# Patient Record
Sex: FEMALE | Race: OTHER | NOT HISPANIC OR LATINO | URBAN - METROPOLITAN AREA
[De-identification: names, ages, dates, MRNs, and addresses within clinical notes are randomized per-mention and may not be internally consistent; named-entity substitution may affect disease eponyms.]

---

## 2019-06-19 ENCOUNTER — OFFICE VISIT (OUTPATIENT)
Dept: URGENT CARE | Facility: CLINIC | Age: 69
End: 2019-06-19
Payer: MEDICARE

## 2019-06-19 ENCOUNTER — APPOINTMENT (OUTPATIENT)
Dept: RADIOLOGY | Facility: CLINIC | Age: 69
End: 2019-06-19
Payer: MEDICARE

## 2019-06-19 VITALS
WEIGHT: 217 LBS | OXYGEN SATURATION: 100 % | DIASTOLIC BLOOD PRESSURE: 94 MMHG | HEART RATE: 90 BPM | HEIGHT: 70 IN | BODY MASS INDEX: 31.07 KG/M2 | TEMPERATURE: 98 F | RESPIRATION RATE: 18 BRPM | SYSTOLIC BLOOD PRESSURE: 158 MMHG

## 2019-06-19 DIAGNOSIS — S52.531A CLOSED COLLES' FRACTURE OF RIGHT RADIUS, INITIAL ENCOUNTER: ICD-10-CM

## 2019-06-19 DIAGNOSIS — M25.531 RIGHT WRIST PAIN: Primary | ICD-10-CM

## 2019-06-19 DIAGNOSIS — S50.01XA CONTUSION OF RIGHT ELBOW, INITIAL ENCOUNTER: ICD-10-CM

## 2019-06-19 DIAGNOSIS — M25.531 RIGHT WRIST PAIN: ICD-10-CM

## 2019-06-19 PROCEDURE — 29125 APPL SHORT ARM SPLINT STATIC: CPT | Performed by: EMERGENCY MEDICINE

## 2019-06-19 PROCEDURE — 73110 X-RAY EXAM OF WRIST: CPT

## 2019-06-19 PROCEDURE — 99203 OFFICE O/P NEW LOW 30 MIN: CPT | Performed by: EMERGENCY MEDICINE

## 2019-06-19 RX ORDER — SIMVASTATIN 10 MG
10 TABLET ORAL
COMMUNITY
End: 2019-06-21 | Stop reason: ALTCHOICE

## 2019-06-19 RX ORDER — MONTELUKAST SODIUM 10 MG/1
10 TABLET ORAL
COMMUNITY
Start: 2019-06-15

## 2019-06-19 RX ORDER — FLUTICASONE PROPIONATE 50 MCG
1 SPRAY, SUSPENSION (ML) NASAL EVERY MORNING
COMMUNITY

## 2019-06-19 RX ORDER — OXYCODONE AND ACETAMINOPHEN 2.5; 325 MG/1; MG/1
1 TABLET ORAL EVERY 4 HOURS PRN
Qty: 12 TABLET | Refills: 0 | Status: SHIPPED | OUTPATIENT
Start: 2019-06-19 | End: 2019-06-22

## 2019-06-20 RX ORDER — LEVOFLOXACIN 500 MG/1
500 TABLET, FILM COATED ORAL DAILY
Refills: 0 | COMMUNITY
Start: 2019-04-22 | End: 2019-06-21 | Stop reason: ALTCHOICE

## 2019-06-21 ENCOUNTER — OFFICE VISIT (OUTPATIENT)
Dept: OBGYN CLINIC | Facility: CLINIC | Age: 69
End: 2019-06-21
Payer: MEDICARE

## 2019-06-21 VITALS
WEIGHT: 224 LBS | DIASTOLIC BLOOD PRESSURE: 73 MMHG | BODY MASS INDEX: 33.18 KG/M2 | SYSTOLIC BLOOD PRESSURE: 136 MMHG | HEIGHT: 69 IN | HEART RATE: 71 BPM

## 2019-06-21 DIAGNOSIS — M25.531 RIGHT WRIST PAIN: Primary | ICD-10-CM

## 2019-06-21 DIAGNOSIS — S52.571A OTHER CLOSED INTRA-ARTICULAR FRACTURE OF DISTAL END OF RIGHT RADIUS, INITIAL ENCOUNTER: ICD-10-CM

## 2019-06-21 PROBLEM — S52.531A FRACTURE, COLLES, RIGHT, CLOSED: Status: ACTIVE | Noted: 2019-06-21

## 2019-06-21 PROCEDURE — 99204 OFFICE O/P NEW MOD 45 MIN: CPT | Performed by: ORTHOPAEDIC SURGERY

## 2019-06-21 RX ORDER — CEFAZOLIN SODIUM 2 G/50ML
2000 SOLUTION INTRAVENOUS ONCE
Status: CANCELLED | OUTPATIENT
Start: 2019-06-25

## 2019-06-21 RX ORDER — ATORVASTATIN CALCIUM 10 MG/1
10 TABLET, FILM COATED ORAL 3 TIMES WEEKLY
COMMUNITY

## 2019-06-24 ENCOUNTER — TRANSCRIBE ORDERS (OUTPATIENT)
Dept: ADMINISTRATIVE | Facility: HOSPITAL | Age: 69
End: 2019-06-24

## 2019-06-24 ENCOUNTER — OFFICE VISIT (OUTPATIENT)
Dept: URGENT CARE | Facility: CLINIC | Age: 69
End: 2019-06-24
Payer: MEDICARE

## 2019-06-24 VITALS
BODY MASS INDEX: 32.07 KG/M2 | OXYGEN SATURATION: 96 % | HEIGHT: 70 IN | RESPIRATION RATE: 18 BRPM | TEMPERATURE: 98.9 F | DIASTOLIC BLOOD PRESSURE: 64 MMHG | HEART RATE: 82 BPM | SYSTOLIC BLOOD PRESSURE: 140 MMHG | WEIGHT: 224 LBS

## 2019-06-24 VITALS
DIASTOLIC BLOOD PRESSURE: 64 MMHG | WEIGHT: 224 LBS | BODY MASS INDEX: 32.07 KG/M2 | OXYGEN SATURATION: 96 % | TEMPERATURE: 98.9 F | HEIGHT: 70 IN | SYSTOLIC BLOOD PRESSURE: 140 MMHG | HEART RATE: 82 BPM | RESPIRATION RATE: 18 BRPM

## 2019-06-24 DIAGNOSIS — Z01.818 PRE-OP TESTING: ICD-10-CM

## 2019-06-24 DIAGNOSIS — S62.101D CLOSED FRACTURE OF RIGHT WRIST WITH ROUTINE HEALING, SUBSEQUENT ENCOUNTER: Primary | ICD-10-CM

## 2019-06-24 DIAGNOSIS — N30.01 ACUTE CYSTITIS WITH HEMATURIA: ICD-10-CM

## 2019-06-24 DIAGNOSIS — J06.9 VIRAL UPPER RESPIRATORY TRACT INFECTION: Primary | ICD-10-CM

## 2019-06-24 LAB
ATRIAL RATE: 75 BPM
P AXIS: 73 DEGREES
PR INTERVAL: 146 MS
QRS AXIS: 11 DEGREES
QRSD INTERVAL: 102 MS
QT INTERVAL: 394 MS
QTC INTERVAL: 439 MS
SL AMB  POCT GLUCOSE, UA: NEGATIVE
SL AMB LEUKOCYTE ESTERASE,UA: ABNORMAL
SL AMB POCT BILIRUBIN,UA: NEGATIVE
SL AMB POCT BLOOD,UA: ABNORMAL
SL AMB POCT CLARITY,UA: ABNORMAL
SL AMB POCT COLOR,UA: ABNORMAL
SL AMB POCT KETONES,UA: NEGATIVE
SL AMB POCT NITRITE,UA: POSITIVE
SL AMB POCT PH,UA: 6
SL AMB POCT SPECIFIC GRAVITY,UA: 1.01
SL AMB POCT URINE PROTEIN: ABNORMAL
SL AMB POCT UROBILINOGEN: ABNORMAL
T WAVE AXIS: 37 DEGREES
VENTRICULAR RATE: 75 BPM

## 2019-06-24 PROCEDURE — 87086 URINE CULTURE/COLONY COUNT: CPT | Performed by: EMERGENCY MEDICINE

## 2019-06-24 PROCEDURE — 87186 SC STD MICRODIL/AGAR DIL: CPT | Performed by: EMERGENCY MEDICINE

## 2019-06-24 PROCEDURE — 99213 OFFICE O/P EST LOW 20 MIN: CPT | Performed by: EMERGENCY MEDICINE

## 2019-06-24 PROCEDURE — 93010 ELECTROCARDIOGRAM REPORT: CPT | Performed by: INTERNAL MEDICINE

## 2019-06-24 PROCEDURE — 93005 ELECTROCARDIOGRAM TRACING: CPT | Performed by: EMERGENCY MEDICINE

## 2019-06-24 PROCEDURE — 87077 CULTURE AEROBIC IDENTIFY: CPT | Performed by: EMERGENCY MEDICINE

## 2019-06-24 PROCEDURE — G0463 HOSPITAL OUTPT CLINIC VISIT: HCPCS | Performed by: EMERGENCY MEDICINE

## 2019-06-24 RX ORDER — PHENAZOPYRIDINE HYDROCHLORIDE 200 MG/1
200 TABLET, FILM COATED ORAL
Qty: 10 TABLET | Refills: 0 | Status: SHIPPED | OUTPATIENT
Start: 2019-06-24 | End: 2019-06-24 | Stop reason: CLARIF

## 2019-06-24 RX ORDER — NITROFURANTOIN 25; 75 MG/1; MG/1
100 CAPSULE ORAL 2 TIMES DAILY
Qty: 14 CAPSULE | Refills: 0 | Status: SHIPPED | OUTPATIENT
Start: 2019-06-24 | End: 2019-06-24 | Stop reason: CLARIF

## 2019-06-24 RX ORDER — OXYCODONE HYDROCHLORIDE AND ACETAMINOPHEN 5; 325 MG/1; MG/1
1 TABLET ORAL EVERY 4 HOURS PRN
COMMUNITY
End: 2019-07-19

## 2019-06-24 NOTE — PROGRESS NOTES
[facility]        NAME: Carli David is a 79 y o  female  : 1950    MRN: [id number]  DATE: 2019  TIME: 9:27 AM    Assessment and Plan   Closed fracture of right wrist with routine healing, subsequent encounter [S62 101D]  1  Closed fracture of right wrist with routine healing, subsequent encounter  POCT urine dip    Urine culture   2  Pre-op testing  EKG 12 lead         Patient Instructions     Patient Instructions           Follow up with PCP in 3-5 days  Proceed to  ER if symptoms worsen      Chief Complaint     Chief Complaint   Patient presents with    Cough     Dry cough starting yesterday and urinary frequency overnight         History of Present Illness       HPI    Review of Systems   Review of Systems         Current Medications       Current Outpatient Medications:     ascorbic acid (VITAMIN C) 500 mg tablet, Take 500 mg by mouth every morning , Disp: , Rfl:     atorvastatin (LIPITOR) 10 mg tablet, Take 10 mg by mouth 3 (three) times a week , Disp: , Rfl:     calcium carbonate (OS-MICHAEL) 600 MG tablet, Take 600 mg by mouth daily, Disp: , Rfl:     fluticasone (FLONASE) 50 mcg/act nasal spray, 1 spray into each nostril every morning , Disp: , Rfl:     montelukast (SINGULAIR) 10 mg tablet, Take 10 mg by mouth daily at bedtime , Disp: , Rfl:     Multiple Vitamins-Minerals (CENTRUM PO), Take 1 caplet by mouth every morning, Disp: , Rfl:     Omega-3 Fatty Acids (FISH OIL) 1200 MG CAPS, Take 1,200 mg by mouth every morning , Disp: , Rfl:     oxyCODONE-acetaminophen (PERCOCET) 5-325 mg per tablet, Take 1 tablet by mouth every 4 (four) hours as needed for moderate pain, Disp: , Rfl:     Current Allergies     Allergies as of 2019 - Reviewed 2019   Allergen Reaction Noted    Mobic [meloxicam] Rash 2019    Tetracycline Rash 2019            The following portions of the patient's history were reviewed and updated as appropriate: allergies, current medications, past family history, past medical history, past social history, past surgical history and problem list      Past Medical History:   Diagnosis Date    Arthritis     generalized    Fractures     High cholesterol     Irritable bowel syndrome     occasional bouts of it but not bad       Past Surgical History:   Procedure Laterality Date    CERVICAL BIOPSY  W/ LOOP ELECTRODE EXCISION  1998?  WISDOM TOOTH EXTRACTION  1980S       Family History   Problem Relation Age of Onset    No Known Problems Mother     Heart disease Father     No Known Problems Sister     No Known Problems Sister     No Known Problems Sister          Medications have been verified  Objective   /64   Pulse 82   Temp 98 9 °F (37 2 °C) (Tympanic)   Resp 18   Ht 5' 10" (1 778 m)   Wt 102 kg (224 lb)   SpO2 96%   BMI 32 14 kg/m²        Physical Exam     Physical Exam   HENT:   Right Ear: Tympanic membrane normal    Left Ear: Tympanic membrane normal    Nose: Mucosal edema present  Mouth/Throat: Posterior oropharyngeal erythema present  No tonsillar abscesses  Abdominal:   Suprapubic tenderness, no rebound or guarding  No CVA tenderness

## 2019-06-25 ENCOUNTER — ANESTHESIA EVENT (OUTPATIENT)
Dept: PERIOP | Facility: HOSPITAL | Age: 69
End: 2019-06-25
Payer: MEDICARE

## 2019-06-26 ENCOUNTER — TELEPHONE (OUTPATIENT)
Dept: OBGYN CLINIC | Facility: HOSPITAL | Age: 69
End: 2019-06-26

## 2019-06-27 ENCOUNTER — ANESTHESIA (OUTPATIENT)
Dept: PERIOP | Facility: HOSPITAL | Age: 69
End: 2019-06-27
Payer: MEDICARE

## 2019-06-27 LAB — BACTERIA UR CULT: ABNORMAL

## 2019-06-28 ENCOUNTER — APPOINTMENT (OUTPATIENT)
Dept: RADIOLOGY | Facility: CLINIC | Age: 69
End: 2019-06-28
Payer: MEDICARE

## 2019-06-28 ENCOUNTER — OFFICE VISIT (OUTPATIENT)
Dept: OBGYN CLINIC | Facility: CLINIC | Age: 69
End: 2019-06-28
Payer: MEDICARE

## 2019-06-28 VITALS
SYSTOLIC BLOOD PRESSURE: 148 MMHG | HEIGHT: 70 IN | BODY MASS INDEX: 32.07 KG/M2 | WEIGHT: 224 LBS | HEART RATE: 74 BPM | DIASTOLIC BLOOD PRESSURE: 76 MMHG

## 2019-06-28 DIAGNOSIS — S52.571A OTHER CLOSED INTRA-ARTICULAR FRACTURE OF DISTAL END OF RIGHT RADIUS, INITIAL ENCOUNTER: ICD-10-CM

## 2019-06-28 DIAGNOSIS — S52.571A OTHER CLOSED INTRA-ARTICULAR FRACTURE OF DISTAL END OF RIGHT RADIUS, INITIAL ENCOUNTER: Primary | ICD-10-CM

## 2019-06-28 PROCEDURE — 99214 OFFICE O/P EST MOD 30 MIN: CPT | Performed by: ORTHOPAEDIC SURGERY

## 2019-06-28 PROCEDURE — 73110 X-RAY EXAM OF WRIST: CPT

## 2019-06-28 RX ORDER — LEVOFLOXACIN 500 MG/1
500 TABLET, FILM COATED ORAL DAILY
Refills: 0 | COMMUNITY
Start: 2019-06-26 | End: 2019-07-19

## 2019-06-28 RX ORDER — NITROFURANTOIN 25; 75 MG/1; MG/1
CAPSULE ORAL
Refills: 0 | COMMUNITY
Start: 2019-06-24 | End: 2019-07-03

## 2019-06-28 NOTE — PROGRESS NOTES
CHIEF COMPLAINT:  Chief Complaint   Patient presents with    Right Wrist - Pain       SUBJECTIVE:  Josy Junior is a 79y o  year old RHD female who presents to the office for evaluation of her right awrist after being referred by Dr Sheldon Franks  Pt sustained injury on 19  Pt was previously scheduled for ORIF but eas diagnosed with bronchitis and UTI so it was canceled  Pt states that she has not been taking anything for pain at this time  Pt denies previous injury or surgery  The patient denies any cardiac issues  Denies diabetes  Denies any history of MI, gastric ulcers, kidney or liver issues  Denies blood thinners  Pt has recently been diagnosed with bronchitis and has been taking Levaquin  Pt has been diagnosed with UTI and is taking Macrodantin  PAST MEDICAL HISTORY:  Past Medical History:   Diagnosis Date    Arthritis     generalized    Fractures     High cholesterol     Irritable bowel syndrome     occasional bouts of it but not bad       PAST SURGICAL HISTORY:  Past Surgical History:   Procedure Laterality Date    CERVICAL BIOPSY  W/ LOOP ELECTRODE EXCISION  ?     WISDOM TOOTH EXTRACTION  1980S       FAMILY HISTORY:  Family History   Problem Relation Age of Onset    No Known Problems Mother     Heart disease Father     No Known Problems Sister     No Known Problems Sister     No Known Problems Sister        SOCIAL HISTORY:  Social History     Tobacco Use    Smoking status: Former Smoker     Last attempt to quit:      Years since quittin 5    Smokeless tobacco: Never Used   Substance Use Topics    Alcohol use: Yes     Frequency: 4 or more times a week     Drinks per session: 1 or 2     Binge frequency: Never     Comment: occasionally    Drug use: Never       MEDICATIONS:    Current Outpatient Medications:     ascorbic acid (VITAMIN C) 500 mg tablet, Take 500 mg by mouth every morning , Disp: , Rfl:     atorvastatin (LIPITOR) 10 mg tablet, Take 10 mg by mouth 3 (three) times a week , Disp: , Rfl:     calcium carbonate (OS-MICHAEL) 600 MG tablet, Take 600 mg by mouth daily, Disp: , Rfl:     fluticasone (FLONASE) 50 mcg/act nasal spray, 1 spray into each nostril every morning , Disp: , Rfl:     levofloxacin (LEVAQUIN) 500 mg tablet, Take 500 mg by mouth daily, Disp: , Rfl: 0    montelukast (SINGULAIR) 10 mg tablet, Take 10 mg by mouth daily at bedtime , Disp: , Rfl:     Multiple Vitamins-Minerals (CENTRUM PO), Take 1 caplet by mouth every morning, Disp: , Rfl:     nitrofurantoin (MACROBID) 100 mg capsule, take 1 capsule by mouth twice a day for 7 days, Disp: , Rfl: 0    nitrofurantoin (MACRODANTIN) 100 mg capsule, Take 100 mg by mouth 2 (two) times a day, Disp: , Rfl:     Omega-3 Fatty Acids (FISH OIL) 1200 MG CAPS, Take 1,200 mg by mouth every morning , Disp: , Rfl:     oxyCODONE-acetaminophen (PERCOCET) 5-325 mg per tablet, Take 1 tablet by mouth every 4 (four) hours as needed for moderate pain, Disp: , Rfl:     phenazopyridine (PYRIDIUM) 200 mg tablet, Take 200 mg by mouth 3 (three) times a day with meals, Disp: , Rfl:     ALLERGIES:  Allergies   Allergen Reactions    Mobic [Meloxicam] Rash    Tetracycline Rash       REVIEW OF SYSTEMS:  Review of Systems   Constitutional: Negative for chills, fever and unexpected weight change  HENT: Negative for hearing loss, nosebleeds and sore throat  Eyes: Negative for pain, redness and visual disturbance  Respiratory: Positive for cough, shortness of breath and wheezing  Cardiovascular: Negative for chest pain, palpitations and leg swelling  Gastrointestinal: Negative for abdominal pain, nausea and vomiting  Endocrine: Negative for polydipsia and polyuria  Genitourinary: Negative for dysuria and hematuria  Skin: Negative for rash and wound  Neurological: Negative for dizziness, numbness and headaches     Psychiatric/Behavioral: Negative for decreased concentration, dysphoric mood and suicidal ideas  The patient is not nervous/anxious  VITALS:  Vitals:    06/28/19 1002   BP: 148/76   Pulse: 74       LABS:  HgA1c: No results found for: HGBA1C  BMP: No results found for: GLUCOSE, CALCIUM, NA, K, CO2, CL, BUN, CREATININE    _____________________________________________________  PHYSICAL EXAMINATION:  General: well developed and well nourished, alert, oriented times 3 and appears comfortable  Psychiatric: Normal  HEENT: Trachea Midline, No torticollis  Cardiac:  Regular rate and rhythm  Pulmonary: No respiratory distress  Fluid in lungs due to bronchitis   Skin: No masses, erythema, lacerations, fluctation, ulcerations  Neurovascular: Sensation Intact to the Median, Ulnar, Radial Nerve, Motor Intact to the Median, Ulnar, Radial Nerve and Pulses Intact    MUSCULOSKELETAL EXAMINATION:  Right wrist   Ecchymosis noted entire forearm  Motion not tested due to injury     ___________________________________________________  STUDIES REVIEWED:  Images obtained today of the right wrist 3 views demonstrate intra-articular distal radius fracture      PROCEDURES PERFORMED:  Procedures  No Procedures performed today    _____________________________________________________  ASSESSMENT/PLAN:    Right distal radius fracture  ORif right distal radius fracture was discussed at length today including the risks and benefits  Pt understands and wants to proceed  *Surgery-ORIF right distal radius fracture    * detailed consent was signed in the office   * anesthesia- regional with sedation    * antibiotics- ordered    * OT order was placed   * Post op medication was sent to the office on file    Surgery medication instructions: You will stop eating and drinking at midnight the night before your surgery, but you may continue to take your normal medications with a small sip of water      In the morning on the day of your surgery, we would like you to take the following medications (as long as you have never been told to avoid Tylenol or NSAIDs like ibuprofen, Naproxen, Aleve, Advil, etc):   Ibuprofen 600mg one tablet by mouth   Tylenol 500mg one tablet by mouth    After surgery, we would like you to take Ibuprofen 600mg one tablet by mouth every 6 hours with food (at breakfast, lunch and dinner)  AND Tylenol 500 mg one tablet by mouth every 6 hours  (at breakfast, lunch and dinner) for 5-7 days after your surgery  Please take these medication EVERYDAY after surgery for 5-7 days, and not just as needed  You can take these medications at the same time  Taking these medications after surgery will limit your need for prescription pain medication  We will also prescribe a narcotic pain medication for a limited time after surgery that you can take as needed for moderate or severe pain  Diagnoses and all orders for this visit:    Other closed intra-articular fracture of distal end of right radius, initial encounter  -     XR wrist 3+ vw right; Future  -     Ambulatory referral to PT/OT hand therapy; Future    Other orders  -     levofloxacin (LEVAQUIN) 500 mg tablet; Take 500 mg by mouth daily  -     nitrofurantoin (MACROBID) 100 mg capsule; take 1 capsule by mouth twice a day for 7 days        Follow Up:  Return for after surgery  To Do Next Visit:  Re-evaluation of current issue      Operative Discussions:  Distal Radius Fracture Fixation: Both operative and non operative management of the injury was explained to the patient  The decision was made to undergo surgical fixation  The surgical procedure was explained with a verbal understanding expressed by the patient  Postoperatively, the patient is to begin with occupational therapy to have a removable splint applied  This splint may be removed for showering, bathing, dining, sedative activities (eg watching tv, reading etc ) and daily therapy exercises   Radiographs are typically taken at intervals throughout the fracture healing ensure maintenance of reduction and alignment  If the fracture loses its alignment, revision surgery may be required  Medical conditions such as diabetes, osteoporosis, vitamin D deficiency, and a history of or exposure to smoking may delay or prevent fracture healing  The risks and benefits of the procedure were explained to the patient, which include, but are not limited to: Bleeding, infection, recurrence, pain, scar, malunion, nonunion, damage to tendons, damage to nerves, and damage to blood vessels, and complications related to anesthesia, failure to give desire result, need for more surgery  These risks, along with alternative conservative treatment options, and postoperative protocols were voiced back and understood by the patient  All questions were answered to the patient's satisfaction  The patient agrees to comply with a standard postoperative protocol, and is willing to proceed  Education was provided via written and auditory forms  There were no barriers to learning  Written handouts regarding wound care, incision and scar care, and general preoperative information was provided to the patient  Prior to surgery, the patient may be requested to stop all anti-inflammatory medications  Prophylactic aspirin, Plavix, and Coumadin may be allowed to be continued  Medications including vitamin E , ginkgo, and fish oil are requested to be stopped approximately one week prior to surgery  Hypertensive medications and beta blockers, if taken, should be continued      Scribe Attestation    I,:   Citlaly Alberts am acting as a scribe while in the presence of the attending physician :        I,:   Josefina Ghotra MD personally performed the services described in this documentation    as scribed in my presence :

## 2019-07-01 ENCOUNTER — TELEPHONE (OUTPATIENT)
Dept: OBGYN CLINIC | Facility: HOSPITAL | Age: 69
End: 2019-07-01

## 2019-07-01 PROBLEM — S52.501A CLOSED FRACTURE OF RIGHT DISTAL RADIUS: Status: ACTIVE | Noted: 2019-07-01

## 2019-07-01 RX ORDER — OXYCODONE HYDROCHLORIDE AND ACETAMINOPHEN 5; 325 MG/1; MG/1
1 TABLET ORAL EVERY 4 HOURS PRN
Qty: 30 TABLET | Refills: 0 | Status: SHIPPED | OUTPATIENT
Start: 2019-07-01 | End: 2019-07-19

## 2019-07-01 RX ORDER — CEFAZOLIN SODIUM 2 G/50ML
2000 SOLUTION INTRAVENOUS ONCE
Status: CANCELLED | OUTPATIENT
Start: 2019-07-05 | End: 2019-07-01

## 2019-07-01 NOTE — H&P
CHIEF COMPLAINT:  Chief Complaint   Patient presents with    Right Wrist - Pain       SUBJECTIVE:  Berlin Palumbo is a 79y o  year old RHD female who presents to the office for evaluation of her right awrist after being referred by Dr Socrates Glaser  Pt sustained injury on 19  Pt was previously scheduled for ORIF but eas diagnosed with bronchitis and UTI so it was canceled  Pt states that she has not been taking anything for pain at this time  Pt denies previous injury or surgery  The patient denies any cardiac issues  Denies diabetes  Denies any history of MI, gastric ulcers, kidney or liver issues  Denies blood thinners  Pt has recently been diagnosed with bronchitis and has been taking Levaquin  Pt has been diagnosed with UTI and is taking Macrodantin  PAST MEDICAL HISTORY:  Past Medical History:   Diagnosis Date    Arthritis     generalized    Fractures     High cholesterol     Irritable bowel syndrome     occasional bouts of it but not bad       PAST SURGICAL HISTORY:  Past Surgical History:   Procedure Laterality Date    CERVICAL BIOPSY  W/ LOOP ELECTRODE EXCISION  ?     WISDOM TOOTH EXTRACTION  1980S       FAMILY HISTORY:  Family History   Problem Relation Age of Onset    No Known Problems Mother     Heart disease Father     No Known Problems Sister     No Known Problems Sister     No Known Problems Sister        SOCIAL HISTORY:  Social History     Tobacco Use    Smoking status: Former Smoker     Last attempt to quit:      Years since quittin 5    Smokeless tobacco: Never Used   Substance Use Topics    Alcohol use: Yes     Frequency: 4 or more times a week     Drinks per session: 1 or 2     Binge frequency: Never     Comment: occasionally    Drug use: Never       MEDICATIONS:    Current Outpatient Medications:     ascorbic acid (VITAMIN C) 500 mg tablet, Take 500 mg by mouth every morning , Disp: , Rfl:     atorvastatin (LIPITOR) 10 mg tablet, Take 10 mg by mouth 3 (three) times a week , Disp: , Rfl:     calcium carbonate (OS-MICHAEL) 600 MG tablet, Take 600 mg by mouth daily, Disp: , Rfl:     fluticasone (FLONASE) 50 mcg/act nasal spray, 1 spray into each nostril every morning , Disp: , Rfl:     levofloxacin (LEVAQUIN) 500 mg tablet, Take 500 mg by mouth daily, Disp: , Rfl: 0    montelukast (SINGULAIR) 10 mg tablet, Take 10 mg by mouth daily at bedtime , Disp: , Rfl:     Multiple Vitamins-Minerals (CENTRUM PO), Take 1 caplet by mouth every morning, Disp: , Rfl:     nitrofurantoin (MACROBID) 100 mg capsule, take 1 capsule by mouth twice a day for 7 days, Disp: , Rfl: 0    nitrofurantoin (MACRODANTIN) 100 mg capsule, Take 100 mg by mouth 2 (two) times a day, Disp: , Rfl:     Omega-3 Fatty Acids (FISH OIL) 1200 MG CAPS, Take 1,200 mg by mouth every morning , Disp: , Rfl:     oxyCODONE-acetaminophen (PERCOCET) 5-325 mg per tablet, Take 1 tablet by mouth every 4 (four) hours as needed for moderate pain, Disp: , Rfl:     phenazopyridine (PYRIDIUM) 200 mg tablet, Take 200 mg by mouth 3 (three) times a day with meals, Disp: , Rfl:     ALLERGIES:  Allergies   Allergen Reactions    Mobic [Meloxicam] Rash    Tetracycline Rash       REVIEW OF SYSTEMS:  Review of Systems   Constitutional: Negative for chills, fever and unexpected weight change  HENT: Negative for hearing loss, nosebleeds and sore throat  Eyes: Negative for pain, redness and visual disturbance  Respiratory: Positive for cough, shortness of breath and wheezing  Cardiovascular: Negative for chest pain, palpitations and leg swelling  Gastrointestinal: Negative for abdominal pain, nausea and vomiting  Endocrine: Negative for polydipsia and polyuria  Genitourinary: Negative for dysuria and hematuria  Skin: Negative for rash and wound  Neurological: Negative for dizziness, numbness and headaches     Psychiatric/Behavioral: Negative for decreased concentration, dysphoric mood and suicidal ideas  The patient is not nervous/anxious  VITALS:  Vitals:    06/28/19 1002   BP: 148/76   Pulse: 74       LABS:  HgA1c: No results found for: HGBA1C  BMP: No results found for: GLUCOSE, CALCIUM, NA, K, CO2, CL, BUN, CREATININE    _____________________________________________________  PHYSICAL EXAMINATION:  General: well developed and well nourished, alert, oriented times 3 and appears comfortable  Psychiatric: Normal  HEENT: Trachea Midline, No torticollis  Cardiac:  Regular rate and rhythm  Pulmonary: No respiratory distress  Fluid in lungs due to bronchitis   Skin: No masses, erythema, lacerations, fluctation, ulcerations  Neurovascular: Sensation Intact to the Median, Ulnar, Radial Nerve, Motor Intact to the Median, Ulnar, Radial Nerve and Pulses Intact    MUSCULOSKELETAL EXAMINATION:  Right wrist   Ecchymosis noted entire forearm  Motion not tested due to injury     ___________________________________________________  STUDIES REVIEWED:  Images obtained today of the right wrist 3 views demonstrate intra-articular distal radius fracture      PROCEDURES PERFORMED:  Procedures  No Procedures performed today    _____________________________________________________  ASSESSMENT/PLAN:    Right distal radius fracture  ORif right distal radius fracture was discussed at length today including the risks and benefits  Pt understands and wants to proceed  *Surgery-ORIF right distal radius fracture    * detailed consent was signed in the office   * anesthesia- regional with sedation    * antibiotics- ordered    * OT order was placed   * Post op medication was sent to the office on file    Surgery medication instructions: You will stop eating and drinking at midnight the night before your surgery, but you may continue to take your normal medications with a small sip of water      In the morning on the day of your surgery, we would like you to take the following medications (as long as you have never been told to avoid Tylenol or NSAIDs like ibuprofen, Naproxen, Aleve, Advil, etc):   Ibuprofen 600mg one tablet by mouth   Tylenol 500mg one tablet by mouth    After surgery, we would like you to take Ibuprofen 600mg one tablet by mouth every 6 hours with food (at breakfast, lunch and dinner)  AND Tylenol 500 mg one tablet by mouth every 6 hours  (at breakfast, lunch and dinner) for 5-7 days after your surgery  Please take these medication EVERYDAY after surgery for 5-7 days, and not just as needed  You can take these medications at the same time  Taking these medications after surgery will limit your need for prescription pain medication  We will also prescribe a narcotic pain medication for a limited time after surgery that you can take as needed for moderate or severe pain  Diagnoses and all orders for this visit:    Other closed intra-articular fracture of distal end of right radius, initial encounter  -     XR wrist 3+ vw right; Future  -     Ambulatory referral to PT/OT hand therapy; Future    Other orders  -     levofloxacin (LEVAQUIN) 500 mg tablet; Take 500 mg by mouth daily  -     nitrofurantoin (MACROBID) 100 mg capsule; take 1 capsule by mouth twice a day for 7 days        Follow Up:  Return for after surgery  To Do Next Visit:  Re-evaluation of current issue      Operative Discussions:  Distal Radius Fracture Fixation: Both operative and non operative management of the injury was explained to the patient  The decision was made to undergo surgical fixation  The surgical procedure was explained with a verbal understanding expressed by the patient  Postoperatively, the patient is to begin with occupational therapy to have a removable splint applied  This splint may be removed for showering, bathing, dining, sedative activities (eg watching tv, reading etc ) and daily therapy exercises   Radiographs are typically taken at intervals throughout the fracture healing ensure maintenance of reduction and alignment  If the fracture loses its alignment, revision surgery may be required  Medical conditions such as diabetes, osteoporosis, vitamin D deficiency, and a history of or exposure to smoking may delay or prevent fracture healing  The risks and benefits of the procedure were explained to the patient, which include, but are not limited to: Bleeding, infection, recurrence, pain, scar, malunion, nonunion, damage to tendons, damage to nerves, and damage to blood vessels, and complications related to anesthesia, failure to give desire result, need for more surgery  These risks, along with alternative conservative treatment options, and postoperative protocols were voiced back and understood by the patient  All questions were answered to the patient's satisfaction  The patient agrees to comply with a standard postoperative protocol, and is willing to proceed  Education was provided via written and auditory forms  There were no barriers to learning  Written handouts regarding wound care, incision and scar care, and general preoperative information was provided to the patient  Prior to surgery, the patient may be requested to stop all anti-inflammatory medications  Prophylactic aspirin, Plavix, and Coumadin may be allowed to be continued  Medications including vitamin E , ginkgo, and fish oil are requested to be stopped approximately one week prior to surgery  Hypertensive medications and beta blockers, if taken, should be continued      Scribe Attestation    I,:   Silvano Bryant am acting as a scribe while in the presence of the attending physician :        I,:   Guille Harden MD personally performed the services described in this documentation    as scribed in my presence :

## 2019-07-01 NOTE — PROGRESS NOTES
While doing patients pre op call she expressed to me that she is still sick with bronchitis  She had a fever this morning, that was alleviated with tylenol, also states she has 3 more days of antibiotics and is still coughing  Spoke with Dr Jameson Paris he states patient is not optimized for surgery at this time, she should finish antibiotics and be fever free for 24-28 hours  Rich Mccray RN to notify Dr Denman Cogan office  Advised patient to call Dr Denman Cogan office to reschedule

## 2019-07-01 NOTE — TELEPHONE ENCOUNTER
Caller: Jake Calles  Call back number: [phone]  Provider: Dr Angel Wagoner    Patient called in and was concerned because she cancelled her [address] tomorrow with Dr Angel Wagoner due to not being cleared 24 hrs of flue/fever  Patient was unsure if she would be hearing about rescheduling or how she should go about that      Please advise, thank you

## 2019-07-02 ENCOUNTER — TELEPHONE (OUTPATIENT)
Dept: SURGERY | Facility: HOSPITAL | Age: 69
End: 2019-07-02

## 2019-07-03 ENCOUNTER — APPOINTMENT (OUTPATIENT)
Dept: PHYSICAL THERAPY | Facility: CLINIC | Age: 69
End: 2019-07-03
Payer: MEDICARE

## 2019-07-03 NOTE — PRE-PROCEDURE INSTRUCTIONS
Pre-Surgery Instructions:   Medication Instructions    ascorbic acid (VITAMIN C) 500 mg tablet Instructed patient per Anesthesia Guidelines  Last dose 7/4    atorvastatin (LIPITOR) 10 mg tablet Instructed patient per Anesthesia Guidelines  Last dose 7/4    calcium carbonate (OS-MICHAEL) 600 MG tablet Instructed patient per Anesthesia Guidelines  Last dose 7/4    fluticasone (FLONASE) 50 mcg/act nasal spray Instructed patient per Anesthesia Guidelines  Take DOS    montelukast (SINGULAIR) 10 mg tablet Instructed patient per Anesthesia Guidelines  Patient takes in the evening    Multiple Vitamins-Minerals (CENTRUM PO) Instructed patient per Anesthesia Guidelines  Last dose 7/4    Omega-3 Fatty Acids (FISH OIL) 1200 MG CAPS Instructed patient per Anesthesia Guidelines  Last dose 7/30    [DISCONTINUED] nitrofurantoin (MACROBID) 100 mg capsule Instructed patient per Anesthesia Guidelines  Discontinued      Spoke with patient    My Surgical Experience    The following information was developed to assist you to prepare for your operation  What do I need to do before coming to the hospital?   Arrange for a responsible person to drive you to and from the hospital    Arrange care for your children at home  Children are not allowed in the recovery areas of the hospital   Plan to wear clothing that is easy to put on and take off  If you are having shoulder surgery, wear a shirt that buttons or zippers in the front  Bathing  o Shower the evening before and the morning of your surgery with an antibacterial soap  Please refer to the Pre Op Showering Instructions for Surgery Patients Sheet   o Remove nail polish and all body piercing jewelry  o Do not shave any body part for at least 24 hours before surgery-this includes face, arms, legs and upper body  Food  o Nothing to eat or drink after midnight the night before your surgery   This includes candy and chewing gum  o Exception: If your surgery is after 12:00pm (noon), you may have clear liquids such as 7-Up®, ginger ale, apple or cranberry juice, Jell-O®, water, or clear broth until 8:00 am  o Do not drink milk or juice with pulp on the morning before surgery  o Do not drink alcohol 24 hours before surgery  Medicine  o Follow instructions you received from your surgeon about which medicines you may take on the day of surgery  o If instructed to take medicine on the morning of surgery, take pills with just a small sip of water  Call your prescribing doctor for specific infroamtion on what to do if you take insulin    What should I bring to the hospital?    Bring:  Rena Bohr or a walker, if you have them, for foot or knee surgery   A list of the daily medicines, vitamins, minerals, herbals and nutritional supplements you take  Include the dosages of medicines and the time you take them each day   Glasses, dentures or hearing aids   Minimal clothing; you will be wearing hospital sleepwear   Photo ID; required to verify your identity   If you have a Living Will or Power of , bring a copy of the documents   If you have an ostomy, bring an extra pouch and any supplies you use    Do not bring   Medicines or inhalers   Money, valuables or jewelry    What other information should I know about the day of surgery?  Notify your surgeons if you develop a cold, sore throat, cough, fever, rash or any other illness   Report to the Ambulatory Surgical/Same Day Surgery Unit   You will be instructed to stop at Registration only if you have not been pre-registered   Inform your  fi they do not stay that they will be asked by the staff to leave a phone number where they can be reached   Be available to be reached before surgery   In the event the operating room schedule changes, you may be asked to come in earlier or later than expected    *It is important to tell your doctor and others involved in your health care if you are taking or have been taking any non-prescription drugs, vitamins, minerals, herbals or other nutritional supplements   Any of these may interact with some food or medicines and cause a reaction

## 2019-07-05 ENCOUNTER — HOSPITAL ENCOUNTER (OUTPATIENT)
Facility: HOSPITAL | Age: 69
Setting detail: OUTPATIENT SURGERY
Discharge: HOME | End: 2019-07-05
Attending: ORTHOPAEDIC SURGERY | Admitting: ORTHOPAEDIC SURGERY
Payer: MEDICARE

## 2019-07-05 ENCOUNTER — APPOINTMENT (OUTPATIENT)
Dept: RADIOLOGY | Facility: HOSPITAL | Age: 69
End: 2019-07-05
Payer: MEDICARE

## 2019-07-05 VITALS
SYSTOLIC BLOOD PRESSURE: 121 MMHG | TEMPERATURE: 98.2 F | HEART RATE: 83 BPM | OXYGEN SATURATION: 93 % | RESPIRATION RATE: 18 BRPM | DIASTOLIC BLOOD PRESSURE: 58 MMHG

## 2019-07-05 DIAGNOSIS — S52.531A CLOSED COLLES' FRACTURE OF RIGHT RADIUS, INITIAL ENCOUNTER: Primary | ICD-10-CM

## 2019-07-05 PROCEDURE — 35206 REPAIR BLOOD VESSEL DIR UXTR: CPT | Performed by: ORTHOPAEDIC SURGERY

## 2019-07-05 PROCEDURE — 94760 N-INVAS EAR/PLS OXIMETRY 1: CPT

## 2019-07-05 PROCEDURE — 25609 OPTX DST RD XART FX/EP SEP3+: CPT | Performed by: ORTHOPAEDIC SURGERY

## 2019-07-05 PROCEDURE — C1713 ANCHOR/SCREW BN/BN,TIS/BN: HCPCS | Performed by: ORTHOPAEDIC SURGERY

## 2019-07-05 PROCEDURE — 73110 X-RAY EXAM OF WRIST: CPT

## 2019-07-05 PROCEDURE — 25609 OPTX DST RD XART FX/EP SEP3+: CPT | Performed by: PHYSICIAN ASSISTANT

## 2019-07-05 PROCEDURE — 35206 REPAIR BLOOD VESSEL DIR UXTR: CPT | Performed by: PHYSICIAN ASSISTANT

## 2019-07-05 PROCEDURE — 94640 AIRWAY INHALATION TREATMENT: CPT

## 2019-07-05 DEVICE — 2.3MM X 20MM LOCKING CORTICAL PEG
Type: IMPLANTABLE DEVICE | Site: WRIST | Status: FUNCTIONAL
Brand: ACUMED

## 2019-07-05 DEVICE — 2.3MM X 22MM LOCKING CORTICAL SCREW
Type: IMPLANTABLE DEVICE | Site: WRIST | Status: FUNCTIONAL
Brand: ACUMED

## 2019-07-05 DEVICE — 2.3MM X 18MM LOCKING CORTICAL SCREW
Type: IMPLANTABLE DEVICE | Site: WRIST | Status: FUNCTIONAL
Brand: ACUMED

## 2019-07-05 DEVICE — 3.5MM X 12MM NON-LOCKING HEXALOBE SCREW
Type: IMPLANTABLE DEVICE | Site: WRIST | Status: FUNCTIONAL
Brand: ACUMED

## 2019-07-05 DEVICE — ACU-LOC® 2 VDR PLT STD R
Type: IMPLANTABLE DEVICE | Site: WRIST | Status: FUNCTIONAL
Brand: ACUMED

## 2019-07-05 DEVICE — 3.5MM X 14MM LOCKING HEXALOBE SCREW
Type: IMPLANTABLE DEVICE | Site: WRIST | Status: FUNCTIONAL
Brand: ACUMED

## 2019-07-05 DEVICE — 2.3MM X 24MM LOCKING CORTICAL SCREW
Type: IMPLANTABLE DEVICE | Site: WRIST | Status: FUNCTIONAL
Brand: ACUMED

## 2019-07-05 DEVICE — 2.3MM X 18MM LOCKING CORTICAL PEG
Type: IMPLANTABLE DEVICE | Site: WRIST | Status: FUNCTIONAL
Brand: ACUMED

## 2019-07-05 DEVICE — 3.5MM X 12MM LOCKING HEXALOBE SCREW
Type: IMPLANTABLE DEVICE | Site: WRIST | Status: FUNCTIONAL
Brand: ACUMED

## 2019-07-05 RX ORDER — IPRATROPIUM BROMIDE AND ALBUTEROL SULFATE 2.5; .5 MG/3ML; MG/3ML
3 SOLUTION RESPIRATORY (INHALATION) ONCE
Status: COMPLETED | OUTPATIENT
Start: 2019-07-05 | End: 2019-07-05

## 2019-07-05 RX ORDER — CEFAZOLIN SODIUM 2 G/50ML
SOLUTION INTRAVENOUS AS NEEDED
Status: DISCONTINUED | OUTPATIENT
Start: 2019-07-05 | End: 2019-07-05 | Stop reason: HOSPADM

## 2019-07-05 RX ORDER — TRAMADOL HYDROCHLORIDE 50 MG/1
50 TABLET ORAL EVERY 6 HOURS SCHEDULED
Status: DISCONTINUED | OUTPATIENT
Start: 2019-07-05 | End: 2019-07-05 | Stop reason: HOSPADM

## 2019-07-05 RX ORDER — LIDOCAINE HYDROCHLORIDE AND EPINEPHRINE 20; 5 MG/ML; UG/ML
INJECTION, SOLUTION EPIDURAL; INFILTRATION; INTRACAUDAL; PERINEURAL AS NEEDED
Status: DISCONTINUED | OUTPATIENT
Start: 2019-07-05 | End: 2019-07-05 | Stop reason: HOSPADM

## 2019-07-05 RX ORDER — ACETAMINOPHEN 325 MG/1
650 TABLET ORAL EVERY 6 HOURS PRN
Status: DISCONTINUED | OUTPATIENT
Start: 2019-07-05 | End: 2019-07-05 | Stop reason: HOSPADM

## 2019-07-05 RX ORDER — ASPIRIN 325 MG
325 TABLET, DELAYED RELEASE (ENTERIC COATED) ORAL DAILY
Qty: 14 TABLET | Refills: 0 | Status: SHIPPED | OUTPATIENT
Start: 2019-07-05 | End: 2019-07-19

## 2019-07-05 RX ORDER — LIDOCAINE HYDROCHLORIDE 20 MG/ML
INJECTION, SOLUTION INFILTRATION; PERINEURAL AS NEEDED
Status: DISCONTINUED | OUTPATIENT
Start: 2019-07-05 | End: 2019-07-05 | Stop reason: HOSPADM

## 2019-07-05 RX ORDER — SODIUM CHLORIDE, SODIUM LACTATE, POTASSIUM CHLORIDE, CALCIUM CHLORIDE 600; 310; 30; 20 MG/100ML; MG/100ML; MG/100ML; MG/100ML
125 INJECTION, SOLUTION INTRAVENOUS CONTINUOUS
Status: DISCONTINUED | OUTPATIENT
Start: 2019-07-05 | End: 2019-07-05 | Stop reason: HOSPADM

## 2019-07-05 RX ORDER — ROPIVACAINE HYDROCHLORIDE 5 MG/ML
INJECTION, SOLUTION EPIDURAL; INFILTRATION; PERINEURAL AS NEEDED
Status: DISCONTINUED | OUTPATIENT
Start: 2019-07-05 | End: 2019-07-05 | Stop reason: HOSPADM

## 2019-07-05 RX ORDER — PROPOFOL 10 MG/ML
INJECTION, EMULSION INTRAVENOUS CONTINUOUS PRN
Status: DISCONTINUED | OUTPATIENT
Start: 2019-07-05 | End: 2019-07-05 | Stop reason: HOSPADM

## 2019-07-05 RX ORDER — MAGNESIUM HYDROXIDE 1200 MG/15ML
LIQUID ORAL AS NEEDED
Status: DISCONTINUED | OUTPATIENT
Start: 2019-07-05 | End: 2019-07-05 | Stop reason: HOSPADM

## 2019-07-05 RX ORDER — DEXAMETHASONE SODIUM PHOSPHATE 10 MG/ML
INJECTION, SOLUTION INTRAMUSCULAR; INTRAVENOUS AS NEEDED
Status: DISCONTINUED | OUTPATIENT
Start: 2019-07-05 | End: 2019-07-05 | Stop reason: HOSPADM

## 2019-07-05 RX ORDER — MIDAZOLAM HYDROCHLORIDE 1 MG/ML
INJECTION INTRAMUSCULAR; INTRAVENOUS AS NEEDED
Status: DISCONTINUED | OUTPATIENT
Start: 2019-07-05 | End: 2019-07-05 | Stop reason: HOSPADM

## 2019-07-05 RX ORDER — ONDANSETRON 2 MG/ML
4 INJECTION INTRAMUSCULAR; INTRAVENOUS EVERY 6 HOURS PRN
Status: DISCONTINUED | OUTPATIENT
Start: 2019-07-05 | End: 2019-07-05 | Stop reason: HOSPADM

## 2019-07-05 RX ORDER — PROPOFOL 10 MG/ML
INJECTION, EMULSION INTRAVENOUS AS NEEDED
Status: DISCONTINUED | OUTPATIENT
Start: 2019-07-05 | End: 2019-07-05 | Stop reason: HOSPADM

## 2019-07-05 RX ORDER — CEFAZOLIN SODIUM 2 G/50ML
2000 SOLUTION INTRAVENOUS ONCE
Status: COMPLETED | OUTPATIENT
Start: 2019-07-05 | End: 2019-07-05

## 2019-07-05 RX ORDER — LIDOCAINE HYDROCHLORIDE 20 MG/ML
INJECTION, SOLUTION EPIDURAL; INFILTRATION; INTRACAUDAL; PERINEURAL AS NEEDED
Status: DISCONTINUED | OUTPATIENT
Start: 2019-07-05 | End: 2019-07-05 | Stop reason: HOSPADM

## 2019-07-05 RX ORDER — FENTANYL CITRATE 50 UG/ML
INJECTION, SOLUTION INTRAMUSCULAR; INTRAVENOUS AS NEEDED
Status: DISCONTINUED | OUTPATIENT
Start: 2019-07-05 | End: 2019-07-05 | Stop reason: HOSPADM

## 2019-07-05 RX ADMIN — CEFAZOLIN SODIUM 2000 MG: 2 SOLUTION INTRAVENOUS at 14:38

## 2019-07-05 RX ADMIN — FENTANYL CITRATE 50 MCG: 50 INJECTION INTRAMUSCULAR; INTRAVENOUS at 16:50

## 2019-07-05 RX ADMIN — PROPOFOL 20 MG: 10 INJECTION, EMULSION INTRAVENOUS at 14:41

## 2019-07-05 RX ADMIN — PROPOFOL 20 MG: 10 INJECTION, EMULSION INTRAVENOUS at 15:09

## 2019-07-05 RX ADMIN — PROPOFOL 50 MCG/KG/MIN: 10 INJECTION, EMULSION INTRAVENOUS at 14:42

## 2019-07-05 RX ADMIN — FENTANYL CITRATE 50 MCG: 50 INJECTION INTRAMUSCULAR; INTRAVENOUS at 16:02

## 2019-07-05 RX ADMIN — SODIUM CHLORIDE, POTASSIUM CHLORIDE, SODIUM LACTATE AND CALCIUM CHLORIDE: 600; 310; 30; 20 INJECTION, SOLUTION INTRAVENOUS at 16:09

## 2019-07-05 RX ADMIN — MIDAZOLAM HYDROCHLORIDE 1 MG: 1 INJECTION, SOLUTION INTRAMUSCULAR; INTRAVENOUS at 13:38

## 2019-07-05 RX ADMIN — SODIUM CHLORIDE, POTASSIUM CHLORIDE, SODIUM LACTATE AND CALCIUM CHLORIDE 125 ML/HR: 600; 310; 30; 20 INJECTION, SOLUTION INTRAVENOUS at 11:19

## 2019-07-05 RX ADMIN — ROPIVACAINE HYDROCHLORIDE 30 ML: 5 INJECTION, SOLUTION EPIDURAL; INFILTRATION; PERINEURAL at 13:50

## 2019-07-05 RX ADMIN — FENTANYL CITRATE 50 MCG: 50 INJECTION INTRAMUSCULAR; INTRAVENOUS at 13:41

## 2019-07-05 RX ADMIN — FENTANYL CITRATE 50 MCG: 50 INJECTION INTRAMUSCULAR; INTRAVENOUS at 17:10

## 2019-07-05 RX ADMIN — DEXAMETHASONE SODIUM PHOSPHATE 10 MG: 10 INJECTION, SOLUTION INTRAMUSCULAR; INTRAVENOUS at 13:50

## 2019-07-05 RX ADMIN — PROPOFOL 20 MG: 10 INJECTION, EMULSION INTRAVENOUS at 15:44

## 2019-07-05 RX ADMIN — MIDAZOLAM HYDROCHLORIDE 1 MG: 1 INJECTION, SOLUTION INTRAMUSCULAR; INTRAVENOUS at 13:39

## 2019-07-05 RX ADMIN — FENTANYL CITRATE 50 MCG: 50 INJECTION INTRAMUSCULAR; INTRAVENOUS at 16:08

## 2019-07-05 RX ADMIN — PROPOFOL 20 MG: 10 INJECTION, EMULSION INTRAVENOUS at 14:45

## 2019-07-05 RX ADMIN — FENTANYL CITRATE 50 MCG: 50 INJECTION INTRAMUSCULAR; INTRAVENOUS at 13:38

## 2019-07-05 RX ADMIN — LIDOCAINE HYDROCHLORIDE 3 ML: 20 INJECTION, SOLUTION INFILTRATION; PERINEURAL at 14:41

## 2019-07-05 RX ADMIN — LIDOCAINE HYDROCHLORIDE AND EPINEPHRINE 20 ML: 20; 5 INJECTION, SOLUTION EPIDURAL; INFILTRATION; INTRACAUDAL; PERINEURAL at 13:50

## 2019-07-05 RX ADMIN — IPRATROPIUM BROMIDE AND ALBUTEROL SULFATE 3 ML: 2.5; .5 SOLUTION RESPIRATORY (INHALATION) at 11:46

## 2019-07-05 NOTE — DISCHARGE INSTRUCTIONS
Post Operative Instructions    You have had surgery on your arm today, please read and follow the information below:  · Elevate your hand above your elbow during the next 24-48 hours to help with swelling  · Place your hand and arm over your head with motion at your shoulder three times a day  · Do not apply any cream/ointment/oil to your incisions including antibiotics  · Do not soak your hands in standing water (dishwater, tubs, Jacuzzi's, pools, etc ) until given permission (typically 2-3 weeks after injury)    Call the office at [phone]  if you notice any:  · Increased numbness or tingling of your hand or fingers that is not relieved with elevation  · Increasing pain that is not controlled with medication  · Difficulty chewing, breathing, swallowing  · Chest pains or shortness of breath  · Fever over 101 4 degrees  Bandage: Your therapist will remove your bandage at your first therapy appointment  Motion: Move fingers into a fist 5 times a day, DO NOT move any splinted fingers  Weight bearing status: The operated extremity should be non-weight bearing until further notice  Ice: Ice for 10 minutes every hour as needed for swelling x 24 hours  Sling: Sling for comfort for 2-3 days, or until pain block wears off  Pain medication: A prescription for pain medication was provided in the office and sent to your pharmacy  Your prescription pain medication also contains Tylenol  Please limit total Tylenol dose to under 3,000 mg a day  After surgery, we would like you to take Ibuprofen 600mg one tablet by mouth every 6 hours with food (at breakfast, lunch and dinner)  AND Tylenol 500 mg one tablet by mouth every 6 hours  (at breakfast, lunch and dinner) for 5-7 days after your surgery  Please take these medication EVERYDAY after surgery for 5-7 days, and not just as needed  You can take these medications at the same time    Taking these medications after surgery will limit your need for prescription pain medication  PLEASE TAKE ASPIRIN 325MG ONCE A DAY FOR 14 DAYS    If the pain becomes severe, and the pain medication is not alleviating symptoms, The greatest source of pain after surgery is usually a tight dressing due to increased swelling after surgery  If the pain becomes severe after surgery, and the patient medication is NOT alleviating the symptoms, the patient should do the following: The patient should  loosen the top dressing (usually coban or an ace bandage) and loosen/cut the rolled gauze beneath  The 4x4 gauze that is directly covering the incision should remain in place  The splint (if the patient has one) should remain in place  The ace bandage/coban can then be replaced on top in a less constrictive manor  If this does not help relieve the pain/numbness in a few hours, the patient should call our office (number listed below)  and we can have them seen in the office for further evaluation  Follow-up Appointment: 7-10 days with Dr Siddhartha Llanes  Occupational Therapy: 7/8/2019  AFTER THE FIRST THERAPY APPOINTMENT, the patient may remove the splint/dressing for showering and clean the incision with soap and water  Keep incision dry after washing  Do not expose the incision to dirty water (oceans, pools, hot tubs, etc)     ***PLEASE AND NO EXTENSION OF THE RIGHT WRIST DUE TO RADIAL ARTERY REPAIR***    If you need help scheduling Therapy, you can call [phone]        Please call the office at [phone] if you have any questions or concerns regarding your post-operative care

## 2019-07-05 NOTE — OP NOTE
OPERATIVE REPORT    PATIENT NAME: Jay Marte    MEDICAL RECORD NO:  [id number]    PROCEDURE DATE:  19    :  1950    SURGEON:  TEJ Mora , Ph D     Heather Narayanan:  Bebeto Chacko    No qualified resident was available to assist for this surgery   A Physician Assistant was used to aid in reduction and retraction while the orthopedic hardware was placed      PREOPERATIVE DIAGNOSIS:   1) right distal radius 3 part intra-articular fracture    POSTOPERATIVE DIAGNOSIS:  1) right distal radius 3 part intra-articular fracture  2) right radial artery tear    PROCEDURE PERFORMED:    1) Open reduction internal fixation of right distal radius  2) right radial artery repair    ANESTHESIA:  Regional and conscious sedation    COMPLICATIONS: radial artery tear    TOURNIQUET TIME:  65 minutes at 250 mmHg     EQUIPMENT USED:  standard Aculoc plating system     DISPOSITION: Patient was sent to the PACU in stable condition  INDICATIONS: Patient is a 79 y o  female who suffered a right distal radius intra-articular fracture as determined by imaging studies  Surgical intervention was offered and the risks and benefits of open reduction and internal fixation were explained  Consent was obtained for surgical intervention  PROCEDURE:  The patient was identified in the preoperative screening area  Consent was signed and verified after identifying the correct operative site  The patient was taken back to the operating room after receiving axillary block in the pre-op holding area  Regional and conscious sedation was provided  The patients right upper extremity was prepped and draped in normal sterile fashion with chlorhexidine solution  The arm was then elevated and exsanguinated with an Esmarch and tourniquet placed about the brachium was insufflated to 250 mmHg  A volar longitudinal incision was made over the FCR tendon approximately 6-8cm in length ending just proximal to the wrist flexion crease  Subcutaneous tissue was dissected sharply and superficial vessels were cauterized or preserved where possible  The superficial and deep portions of the FCR sheath were incised and the FCR tendon was reflected ulnarly along with the deeper FPL tendon  The pronator quadratus was identified and released off its radial attachment  The fracture site was identified and was irrigated and cleaned of debris  There was significant amount of callus around the fracture site  Significant amount of the callus was removed with Roxana Martinez and [employer]  After removing the callus the fracture fragments were then evaluated  There is comminuted intra-articular fracture of the distal radius with 3 different segments noted  The fractures were debrided of early fracture callus formation and then provisionally fixed with K-wires  Multiple K-wires were utilized to reconstruct the articular surface 1st   The articular surfaces then repaired and held provisionally fixed to the proximal fragment  Once provisional reduction was achieved, the standard Aculoc plate was placed over the fracture site and provisionally stabilized with 0 054 K-wires  Intra-operative fluoroscopic imaging demonstrated good alignment of the fracture fragments and good positioning of the plate  The distal portion of the plate was then secured first  The distal row was filled with 2 4 mm locking pegs and screws of appropriate length  The two styloid holes of the plate were filled with 2 4 mm locking pegs of appropriate length  The temporary K-wires were then removed  Final manipulation of the fracture was then performed prior to securing the plate to the proximal fragment  The plate was secured to the proximal fragment using one 3 5 mm bicortical non-locking screw placed in compression  Two 3 5 mm bicortical locking screws were then placed to lock the final construct  Screws of appropriate length were placed    Fracture stabilization was assessed and found to be stable and secure  Final intra-operative fluoroscopic images were obtained demonstrating good reduction of the fracture and good placement of the hardware  The wound was irrigated with copious amounts of saline solution  The pronator quadratus was then repaired back to the radial margin with 3-0 Ethibond sutures placed in a figure-8 fashion  Good soft tissue interposition between the plate and flexor tendons was achieved  The tendons were riding over soft tissue and not in direct contact with the plate  The Tourniquet was released at approximately 65 minutes with good capillary refill and color in the digits  However, upon release of tourniquet brisk bleeding was noted from the radial artery  After evaluation it was noted that the radial artery had a longitudinal tear  There was good filling through the ulnar artery this demonstrated by good cap refill and color in all digits  There was also backflow from the distal segment of the radial artery demonstrated intact vascular arch  Decision was made then to repair the radial artery  Bulldog clamps were utilized to clamp the vessel ends  The segment with a longitudinal tear was resected  The radial artery ends both proximally distally were then flushed with heparin solution  The artery was then repaired using 7 0 Prolene suture in interrupted simple fashion  After a circumferential repair bulldogs removed and the radial artery demonstrated good brisk filling with good color and digits to the hand  Hemostasis was achieved  The skin was then closed with 4-0 nylon suture in a horizontal mattress fashion  The wound was dressed in a sterile dressing and the wrist was immobilized in a volar wrist splint  The patient tolerated the procedure well, was awakened in the operating room, and sent to the PACU in stable condition      As no first assistant was provided by the hospital, it was elected to use a physician's assistant to stabilize the extremity and aid in instrumentation           Romulo Huitron MD 07/05/19 5:25 PM

## 2019-07-08 ENCOUNTER — EVALUATION (OUTPATIENT)
Dept: PHYSICAL THERAPY | Facility: CLINIC | Age: 69
End: 2019-07-08
Payer: MEDICARE

## 2019-07-08 DIAGNOSIS — S52.571A OTHER CLOSED INTRA-ARTICULAR FRACTURE OF DISTAL END OF RIGHT RADIUS, INITIAL ENCOUNTER: Primary | ICD-10-CM

## 2019-07-08 PROCEDURE — 97140 MANUAL THERAPY 1/> REGIONS: CPT | Performed by: PHYSICAL THERAPIST

## 2019-07-08 PROCEDURE — L3906 WHO W/O JOINTS CF: HCPCS | Performed by: PHYSICAL THERAPIST

## 2019-07-08 PROCEDURE — 97162 PT EVAL MOD COMPLEX 30 MIN: CPT | Performed by: PHYSICAL THERAPIST

## 2019-07-08 PROCEDURE — 97010 HOT OR COLD PACKS THERAPY: CPT | Performed by: PHYSICAL THERAPIST

## 2019-07-08 PROCEDURE — 97110 THERAPEUTIC EXERCISES: CPT | Performed by: PHYSICAL THERAPIST

## 2019-07-08 PROCEDURE — 97112 NEUROMUSCULAR REEDUCATION: CPT | Performed by: PHYSICAL THERAPIST

## 2019-07-08 PROCEDURE — 97535 SELF CARE MNGMENT TRAINING: CPT | Performed by: PHYSICAL THERAPIST

## 2019-07-08 NOTE — PROGRESS NOTES
PT Evaluation     Today's date: 2019  Patient name: Josy Junior  : 1950  MRN: [id number]  Referring provider: Lucero Linn MD  Dx:   Encounter Diagnosis     ICD-10-CM    1  Other closed intra-articular fracture of distal end of right radius, initial encounter S52 571A Ambulatory referral to PT/OT hand therapy                  Assessment  Assessment details: Pt is a 80 YO female presenting to PT with pain, decreased AROM, strength and tolerance to activity  Pt would benefit from skilled intervention to address these issues and maximize overall function  Occupation- retired (part time )  Dominant- right; Involved- right    Goals  ST  Decrease pain to 3-4 in 4 weeks            2  Decrease swelling in hand and wrist            3  Increase AROM to [facility] following protocol for DRF and radial artery repair            4  Maintain clean wound and promote healing            5   Provide orthotic for protection  LT  Increase functional motion and strength for independence with ADL and self care by DC            2  Ability to RTW and recreational activity by DC    Plan  Patient would benefit from: custom splinting and skilled physical therapy  Planned modality interventions: thermotherapy: hydrocollator packs, cryotherapy and ultrasound  Planned therapy interventions: manual therapy, therapeutic exercise, patient education and home exercise program  Frequency: 2x week  Duration in weeks: 4  Treatment plan discussed with: patient        Subjective Evaluation    History of Present Illness  Date of onset: 2019  Date of surgery: 2019  Mechanism of injury: Pt fell at home fracturing her right distal radius  Surgery to stabilize and repair radial artery    Pain  Current pain rating: 3  At best pain rating: 3  At worst pain ratin  Location: right wrist    Hand dominance: right    Treatments  Current treatment: physical therapy  Patient Goals  Patient goals for therapy: decreased edema, decreased pain, increased motion, increased strength, independence with ADLs/IADLs, return to sport/leisure activities and return to work          Objective     General Comments:      Wrist/Hand Comments  AROM right FA S/P- 25/60; Wrist E/F- (-30)/60 (radial artery repair)                      Thumb MP- 0/40; IP- 0/30; digits- MP- 0/60; PIP- 0/75; DIP- 0/45  Strength- un-assessed  Circumference at wrist- 19 5 cm; MPs- 20 0 cm; thumb P1- 8 3 cm; LF P1- 7 2 cm  Sensation- intact to LT all digits  Inspection- post- op dressing removed and replaced with volar wrist brace with wrist in 15 degrees flexion to protect her radial artery repair                  Precautions: no active or passive wrist extension for 4 weeks       Manual  7/8            STM 15                                                   Custom SA wrist sothosis MAG                Exercise Diary  7/8            TGE ---->            AAROM wrist ----->                                                                                                                                                                                                                                                          Modalities  7/8            HP/biph 15            US 12            CP 15

## 2019-07-10 ENCOUNTER — APPOINTMENT (OUTPATIENT)
Dept: PHYSICAL THERAPY | Facility: CLINIC | Age: 69
End: 2019-07-10
Payer: MEDICARE

## 2019-07-11 ENCOUNTER — OFFICE VISIT (OUTPATIENT)
Dept: PHYSICAL THERAPY | Facility: CLINIC | Age: 69
End: 2019-07-11
Payer: MEDICARE

## 2019-07-11 DIAGNOSIS — S52.571A OTHER CLOSED INTRA-ARTICULAR FRACTURE OF DISTAL END OF RIGHT RADIUS, INITIAL ENCOUNTER: Primary | ICD-10-CM

## 2019-07-11 PROCEDURE — 97112 NEUROMUSCULAR REEDUCATION: CPT | Performed by: PHYSICAL THERAPIST

## 2019-07-11 PROCEDURE — 97035 APP MDLTY 1+ULTRASOUND EA 15: CPT | Performed by: PHYSICAL THERAPIST

## 2019-07-11 PROCEDURE — 97140 MANUAL THERAPY 1/> REGIONS: CPT | Performed by: PHYSICAL THERAPIST

## 2019-07-11 PROCEDURE — 97110 THERAPEUTIC EXERCISES: CPT | Performed by: PHYSICAL THERAPIST

## 2019-07-11 PROCEDURE — 97010 HOT OR COLD PACKS THERAPY: CPT | Performed by: PHYSICAL THERAPIST

## 2019-07-11 NOTE — PROGRESS NOTES
Daily Note     Today's date: 2019  Patient name: Josy Junior  : 1950  MRN: [id number]  Referring provider: Lucero Linn MD  Dx:   Encounter Diagnosis     ICD-10-CM    1  Other closed intra-articular fracture of distal end of right radius, initial encounter S52 579M                   Subjective: pt notes continued soreness and swelling in her hand and wrist    She has been wearing her brace as instructed      Objective: See treatment diary below    AROM right FA S/P- ; Wrist E/F- ( (radial artery repair)                      Thumb MP- 0/40; IP- 0/30; digits- MP- 0/60; PIP- 0/75; DIP- 0/45  Strength- un-assessed  Circumference at wrist- 19 5 cm; MPs- 20 0 cm; thumb P1- 8 3 cm; LF P1- 7 2 cm  Sensation- intact to LT all digits  Inspection- post- op dressing removed and replaced with volar wrist brace with wrist in 15 degrees flexion to protect her radial artery repair  Assessment: Tolerated treatment well  Patient would benefit from continued PT      Plan: Continue per plan of care        Precautions: no active or passive wrist extension for 4 weeks       Manual             STM 15 15                                     tubigrip  F           Custom SA wrist sothosis MAG                Exercise Diary             TGE ----> 2/3           AAROM wrist -----> MAG                                                                                                                                                                                                                                                         Modalities             HP/biph 15 15           US 12 12           CP 15 15

## 2019-07-17 ENCOUNTER — OFFICE VISIT (OUTPATIENT)
Dept: PHYSICAL THERAPY | Facility: CLINIC | Age: 69
End: 2019-07-17
Payer: MEDICARE

## 2019-07-17 DIAGNOSIS — S52.571A OTHER CLOSED INTRA-ARTICULAR FRACTURE OF DISTAL END OF RIGHT RADIUS, INITIAL ENCOUNTER: Primary | ICD-10-CM

## 2019-07-17 PROCEDURE — 97535 SELF CARE MNGMENT TRAINING: CPT | Performed by: PHYSICAL THERAPIST

## 2019-07-17 PROCEDURE — 97140 MANUAL THERAPY 1/> REGIONS: CPT | Performed by: PHYSICAL THERAPIST

## 2019-07-17 PROCEDURE — 97010 HOT OR COLD PACKS THERAPY: CPT | Performed by: PHYSICAL THERAPIST

## 2019-07-17 PROCEDURE — 97110 THERAPEUTIC EXERCISES: CPT | Performed by: PHYSICAL THERAPIST

## 2019-07-17 PROCEDURE — 97035 APP MDLTY 1+ULTRASOUND EA 15: CPT | Performed by: PHYSICAL THERAPIST

## 2019-07-17 PROCEDURE — 97112 NEUROMUSCULAR REEDUCATION: CPT | Performed by: PHYSICAL THERAPIST

## 2019-07-17 NOTE — PROGRESS NOTES
Daily Note     Today's date: 2019  Patient name: Nickie Tollivre  : 1950  MRN: [id number]  Referring provider: Elodia Zelaya MD  Dx:   Encounter Diagnosis     ICD-10-CM    1  Other closed intra-articular fracture of distal end of right radius, initial encounter S52 571A                   Subjective: less soreness and swelling  Using splint for protection      Objective: See treatment diary below    AROM right FA S/P- 4580; Wrist E/F- 0 (radial artery repair)                      Thumb MP- 0/40; IP- 0/30; digits- MP- 0/60; PIP- 0/95; DIP- 0/45  Strength- un-assessed  Circumference at wrist- 19 0 cm; MPs- 19 5 cm; thumb P1- 7 8 cm; LF P1- 6 5 cm  Sensation- intact to LT all digits  Inspection- post- op dressing removed and replaced with volar wrist brace with wrist in 15 degrees flexion to protect her radial artery repair        Assessment: Tolerated treatment well  Patient would benefit from continued PT      Plan: Continue per plan of care        Precautions: no active or passive wrist extension for 4 weeks       Manual            STM 15 15 15                                    tubigrip  F F          Custom SA wrist sothosis MAG                Exercise Diary            TGE ----> 2/3 23          AAROM wrist -----> MAG 2/10                         F/A   2/10                       thumb   10                                                                                                                                                                                                                              Modalities            HP/biph 15 15 15          US 12 12 12          CP 15 15 15

## 2019-07-18 ENCOUNTER — OFFICE VISIT (OUTPATIENT)
Dept: PHYSICAL THERAPY | Facility: CLINIC | Age: 69
End: 2019-07-18
Payer: MEDICARE

## 2019-07-18 ENCOUNTER — TELEPHONE (OUTPATIENT)
Dept: OBGYN CLINIC | Facility: CLINIC | Age: 69
End: 2019-07-18

## 2019-07-18 DIAGNOSIS — S52.571A OTHER CLOSED INTRA-ARTICULAR FRACTURE OF DISTAL END OF RIGHT RADIUS, INITIAL ENCOUNTER: Primary | ICD-10-CM

## 2019-07-18 DIAGNOSIS — Z48.89 AFTERCARE FOLLOWING SURGERY: Primary | ICD-10-CM

## 2019-07-18 PROCEDURE — 97110 THERAPEUTIC EXERCISES: CPT | Performed by: PHYSICAL THERAPIST

## 2019-07-18 PROCEDURE — 97140 MANUAL THERAPY 1/> REGIONS: CPT | Performed by: PHYSICAL THERAPIST

## 2019-07-18 PROCEDURE — 97035 APP MDLTY 1+ULTRASOUND EA 15: CPT | Performed by: PHYSICAL THERAPIST

## 2019-07-18 PROCEDURE — 97112 NEUROMUSCULAR REEDUCATION: CPT | Performed by: PHYSICAL THERAPIST

## 2019-07-18 PROCEDURE — 97010 HOT OR COLD PACKS THERAPY: CPT | Performed by: PHYSICAL THERAPIST

## 2019-07-18 NOTE — TELEPHONE ENCOUNTER
Spoke to patient and asked her to go for x-rays before her appointment tomorrow  She stated she will see if her daughter can take her to the [facility] care in [address] [place] [address] or if her ride can bring her tomorrow morning before her appointment to [address] [place] [address]  If she cannot have the x-rays done she will call and let us know

## 2019-07-18 NOTE — PROGRESS NOTES
Daily Note     Today's date: 2019  Patient name: Ellen Ledezma  : 1950  MRN: [id number]  Referring provider: Reba Garcia MD  Dx:   Encounter Diagnosis     ICD-10-CM    1  Other closed intra-articular fracture of distal end of right radius, initial encounter S52 571A                   Subjective: pt notes continues shakiness during her exercises due to decreasing strength      Objective: See treatment diary below    AROM right FA S/P- 45/80; Wrist E/F- 0 (radial artery repair)                      Thumb MP- 0/40; IP- 0/30; digits- MP- 0/60; PIP- 0/95; DIP- 0/45  Strength- un-assessed  Circumference at wrist- 19 0 cm; MPs- 19 5 cm; thumb P1- 7 8 cm; LF P1- 6 5 cm  Sensation- intact to LT all digits  Inspection- post- op dressing removed and replaced with volar wrist brace with wrist in 15 degrees flexion to protect her radial artery repair        Assessment: Tolerated treatment well  Patient would benefit from continued PT      Plan: Continue per plan of care        Precautions: no active or passive wrist extension for 4 weeks       Manual           STM 15 15 15 15                                   tubigrip  F F          Custom SA wrist sothosis MAG                Exercise Diary           TGE ----> 2/3 2/3 23         AAROM wrist -----> MAG 2/10 2/10                        F/A   2/10 2/10                      thumb   2/10 2/10                                                                                                                                                                                                                             Modalities           HP/biph 15 15 15 15         US 12 12 12 12         CP 15 15 15 15

## 2019-07-19 ENCOUNTER — OFFICE VISIT (OUTPATIENT)
Dept: OBGYN CLINIC | Facility: CLINIC | Age: 69
End: 2019-07-19

## 2019-07-19 ENCOUNTER — HOSPITAL ENCOUNTER (OUTPATIENT)
Dept: RADIOLOGY | Facility: HOSPITAL | Age: 69
Discharge: HOME | End: 2019-07-19
Attending: ORTHOPAEDIC SURGERY
Payer: MEDICARE

## 2019-07-19 VITALS
HEIGHT: 70 IN | WEIGHT: 224 LBS | BODY MASS INDEX: 32.07 KG/M2 | SYSTOLIC BLOOD PRESSURE: 114 MMHG | HEART RATE: 66 BPM | DIASTOLIC BLOOD PRESSURE: 74 MMHG

## 2019-07-19 DIAGNOSIS — Z48.89 AFTERCARE FOLLOWING SURGERY: Primary | ICD-10-CM

## 2019-07-19 DIAGNOSIS — Z98.890 S/P ORIF (OPEN REDUCTION INTERNAL FIXATION) FRACTURE: ICD-10-CM

## 2019-07-19 DIAGNOSIS — Z48.89 AFTERCARE FOLLOWING SURGERY: ICD-10-CM

## 2019-07-19 DIAGNOSIS — Z87.81 S/P ORIF (OPEN REDUCTION INTERNAL FIXATION) FRACTURE: ICD-10-CM

## 2019-07-19 PROCEDURE — 99024 POSTOP FOLLOW-UP VISIT: CPT | Performed by: ORTHOPAEDIC SURGERY

## 2019-07-19 PROCEDURE — 73110 X-RAY EXAM OF WRIST: CPT

## 2019-07-19 NOTE — PROGRESS NOTES
SUBJECTIVE:  Casey Rosario is a 79y o  year old female who presents for follow up after surgery, right distal radius ORIF and right radial artery repair performed on  7/5/2019  Today patient has soreness to her right wrist  She is taking Motrin as needed for pain control  She states that she is also taking Aspirin daily due to the radial artery repair  She has been working with OT as well as wearing a custom wrist splint  VITALS:  Vitals:    07/19/19 0902   BP: 114/74   Pulse: 66       PHYSICAL EXAMINATION:  General: well developed and well nourished, alert, oriented times 3 and appears comfortable  Psychiatric: Normal    MUSCULOSKELETAL EXAMINATION:  Right wrist:  Incision: Clean, dry, with sutures intact  Range of Motion: loose full composite fist, inclusion of ulnar artery demonstrated radial artery is intact and working   Neurovascular status: Neuro intact, good cap refill      STUDIES REVIEWED:  Images obtained today of the right wrist  3 views demonstrate orthopeic hardware in good alignment       PROCEDURES PERFORMED:  Procedures  No Procedures performed today      ASSESSMENT/PLAN:    2 weeks s/p right distal radius ORIF and right radial artery repair   * Done today: Sutures out  * Continue OT, with focusing on stretching of the thumb to break up scar tissue, may work on gentle extension of the wrist, current splint with be refurbish to hold the wrist in slight extension    * May drive when motion allows   * Advised to take baby aspirin daily for the next 6 weeks, d/c 325 MG of aspirin   * Follow up in 6 weeks time with right wrist x-ray's       FOLLOW UP:  Return in about 6 weeks (around 8/30/2019)        TO DO AT NEXT VISIT:  Re-evaluation of current issue and X-rays of the  right  wrist      Scribe Attestation    I,:   Donavan Lassiternt am acting as a scribe while in the presence of the attending physician :        I,:   Jez Pablo MD personally performed the services described in this documentation    as scribed in my presence :

## 2019-07-23 ENCOUNTER — OFFICE VISIT (OUTPATIENT)
Dept: PHYSICAL THERAPY | Facility: CLINIC | Age: 69
End: 2019-07-23
Payer: MEDICARE

## 2019-07-23 DIAGNOSIS — S52.571A OTHER CLOSED INTRA-ARTICULAR FRACTURE OF DISTAL END OF RIGHT RADIUS, INITIAL ENCOUNTER: Primary | ICD-10-CM

## 2019-07-23 PROCEDURE — 97035 APP MDLTY 1+ULTRASOUND EA 15: CPT | Performed by: PHYSICAL THERAPIST

## 2019-07-23 PROCEDURE — 97140 MANUAL THERAPY 1/> REGIONS: CPT | Performed by: PHYSICAL THERAPIST

## 2019-07-23 PROCEDURE — 97112 NEUROMUSCULAR REEDUCATION: CPT | Performed by: PHYSICAL THERAPIST

## 2019-07-23 PROCEDURE — 97535 SELF CARE MNGMENT TRAINING: CPT | Performed by: PHYSICAL THERAPIST

## 2019-07-23 PROCEDURE — 97110 THERAPEUTIC EXERCISES: CPT | Performed by: PHYSICAL THERAPIST

## 2019-07-23 PROCEDURE — 97010 HOT OR COLD PACKS THERAPY: CPT | Performed by: PHYSICAL THERAPIST

## 2019-07-23 NOTE — PROGRESS NOTES
Daily Note     Today's date: 2019  Patient name: Daisha Villatoro  : 1950  MRN: [id number]  Referring provider: Bandar Dickey MD  Dx:   Encounter Diagnosis     ICD-10-CM    1  Other closed intra-articular fracture of distal end of right radius, initial encounter S52 571A                   Subjective: pt was seen by her physician and will begin gradual wrist extension and increase thumb motion  Objective: See treatment diary below    PT revised splint into extension  AROM right FA S/P- 60/80; Wrist E/F- 20                       Thumb MP- 0/40; IP- 0/30; digits- MP- 0/60; PIP- 0/95; DIP- 0/45  Strength- un-assessed  Circumference at wrist- 19 0 cm; MPs- 19 5 cm; thumb P1- 7 8 cm; LF P1- 6 5 cm  Sensation- intact to LT all digits  Inspection- sutures out, slight scarring through incision     Assessment: Tolerated treatment well  Patient would benefit from continued PT      Plan: Continue per plan of care        Precautions: no active or passive wrist extension for 4 weeks       Manual          STM 15 15 15 15 15                                  tubigrip  F F          Custom SA wrist sothosis MAG                Exercise Diary          TGE ----> 2/3 2/3 2/3 23        AAROM wrist -----> MAG 2/10 2/10 2/10                       F/A   10 210 210                     thumb   2/10 2/10 2/10        Straight pegs     3R        twister     2/2                                                                                                                                                                                                  Modalities          HP/biph 15 15 15 15 15        US 12 12 12 12 12        CP 15 15 15 15 15

## 2019-07-25 ENCOUNTER — OFFICE VISIT (OUTPATIENT)
Dept: PHYSICAL THERAPY | Facility: CLINIC | Age: 69
End: 2019-07-25
Payer: MEDICARE

## 2019-07-25 DIAGNOSIS — S52.571A OTHER CLOSED INTRA-ARTICULAR FRACTURE OF DISTAL END OF RIGHT RADIUS, INITIAL ENCOUNTER: Primary | ICD-10-CM

## 2019-07-25 PROCEDURE — 97010 HOT OR COLD PACKS THERAPY: CPT

## 2019-07-25 NOTE — PROGRESS NOTES
Daily Note     Today's date: 2019  Patient name: Airam Harrison  : 1950  MRN: [id number]  Referring provider: Manon Apley, MD  Dx:   Encounter Diagnosis     ICD-10-CM    1  Other closed intra-articular fracture of distal end of right radius, initial encounter S52 571A                   Subjective: Pt reports no soreness after last session which included some initial motion activity  Objective:     PT revised splint into extension  AROM right FA S/P- 60/80; Wrist E/F-                        Thumb MP- 0/40; IP- 0/30; digits- MP- 0/60; PIP- 0/95; DIP- 0/45  Strength- un-assessed  Circumference at wrist- 19 0 cm; MPs- 19 5 cm; thumb P1- 7 8 cm; LF P1- 6 5 cm  Sensation- intact to LT all digits  Inspection- sutures out, slight scarring through incision     See treatment diary below    Assessment: Tolerated treatment well today  Pt continues to progress with functional activities in the clinic  Mepiform issued for scar care  Plan: Progress treatment as tolerated         Precautions: no active or passive wrist extension for 4 weeks       Manual         STM 15 15 15 15 15 15             mepiform                    tubigrip  F F          Custom SA wrist sothosis MAG                Exercise Diary         TGE ----> 2/3 2/3 2/3 23 23       AAROM wrist -----> MAG 2/10 2/10 2/10 2/10                      F/A   2/10 2/10 2/10 2/10                    thumb   2/10 2/10 2/10 2/10       Straight pegs     3R 3R       twister     / 2/2                                                                                                                                                                                                 Modalities         HP/biph 15 15 15 15 15 15       US 12 12 12 12 12 12       CP 15 15 15 15 15 15

## 2019-07-30 ENCOUNTER — OFFICE VISIT (OUTPATIENT)
Dept: PHYSICAL THERAPY | Facility: CLINIC | Age: 69
End: 2019-07-30
Payer: MEDICARE

## 2019-07-30 DIAGNOSIS — S52.571A OTHER CLOSED INTRA-ARTICULAR FRACTURE OF DISTAL END OF RIGHT RADIUS, INITIAL ENCOUNTER: Primary | ICD-10-CM

## 2019-07-30 PROCEDURE — 97035 APP MDLTY 1+ULTRASOUND EA 15: CPT | Performed by: PHYSICAL THERAPIST

## 2019-07-30 PROCEDURE — 97140 MANUAL THERAPY 1/> REGIONS: CPT | Performed by: PHYSICAL THERAPIST

## 2019-07-30 PROCEDURE — 97110 THERAPEUTIC EXERCISES: CPT | Performed by: PHYSICAL THERAPIST

## 2019-07-30 PROCEDURE — 97535 SELF CARE MNGMENT TRAINING: CPT | Performed by: PHYSICAL THERAPIST

## 2019-07-30 PROCEDURE — 97112 NEUROMUSCULAR REEDUCATION: CPT | Performed by: PHYSICAL THERAPIST

## 2019-07-30 PROCEDURE — 97010 HOT OR COLD PACKS THERAPY: CPT | Performed by: PHYSICAL THERAPIST

## 2019-07-30 NOTE — PROGRESS NOTES
Daily Note     Today's date: 2019  Patient name: Jordan Hendricks  : 1950  MRN: [id number]  Referring provider: Thang Valente MD  Dx:   Encounter Diagnosis     ICD-10-CM    1  Other closed intra-articular fracture of distal end of right radius, initial encounter S52 571A                   Subjective: pt feels she is gaining motion but reports acheyiness and continued stiffness  She is compliant wearing her splint in risky situations      Objective: See treatment diary below      Assessment: Tolerated treatment well  Patient would benefit from continued PT      Plan: Continue per plan of care        Precautions: no active or passive wrist extension for 4 weeks       Manual        STM 15 15 15 15 15 15 15                   Mepiform       MAG      tubigrip  F F          Custom SA wrist sothosis MAG                Exercise Diary        TGE ----> 2/3 2/3 2/3 2/3 23 23      AAROM wrist -----> MAG 2/10 2/10 2/10 2/10 2/10                     F/A   2/10 2/10 2/10 2/10 2/10                   thumb   2/10 2/10 2/10 2/10 2/10      Straight pegs     3R 3R 3R      twister                                                                                                                                                                                                     Modalities        HP/biph 15 15 15 15 15 15 15      US 12 12 12 12 12 12 12      CP 15 15 15 15 15 15 15

## 2019-08-01 ENCOUNTER — OFFICE VISIT (OUTPATIENT)
Dept: PHYSICAL THERAPY | Facility: CLINIC | Age: 69
End: 2019-08-01
Payer: MEDICARE

## 2019-08-01 DIAGNOSIS — S52.571A OTHER CLOSED INTRA-ARTICULAR FRACTURE OF DISTAL END OF RIGHT RADIUS, INITIAL ENCOUNTER: Primary | ICD-10-CM

## 2019-08-01 PROCEDURE — 97110 THERAPEUTIC EXERCISES: CPT

## 2019-08-01 PROCEDURE — 97112 NEUROMUSCULAR REEDUCATION: CPT

## 2019-08-01 PROCEDURE — 97010 HOT OR COLD PACKS THERAPY: CPT

## 2019-08-01 PROCEDURE — 97140 MANUAL THERAPY 1/> REGIONS: CPT

## 2019-08-01 PROCEDURE — 97035 APP MDLTY 1+ULTRASOUND EA 15: CPT

## 2019-08-01 NOTE — PROGRESS NOTES
Daily Note     Today's date: 2019  Patient name: Airam Harrison  : 1950  MRN: [id number]  Referring provider: Manon Apley, MD  Dx:   Encounter Diagnosis     ICD-10-CM    1  Other closed intra-articular fracture of distal end of right radius, initial encounter S52 957D                   Subjective: Pt  reports she has some achiness, but overall is feeling much better  Objective: See treatment diary below    PT revised splint into extension  AROM right FA S/P- 60/; Wrist E/F-                        Thumb MP- 0/40; IP- 0/30; digits- MP- 0/60; PIP- 0/95; DIP- 0/45  Strength- un-assessed  Circumference at wrist- 19 0 cm; MPs- 19 5 cm; thumb P1- 7 8 cm; LF P1- 6 5 cm  Sensation- intact to LT all digits  Inspection- sutures out, slight scarring through incision     See treatment diary below        Assessment: Tolerated treatment well  Patient exhibited good technique with therapeutic exercises and would benefit from continued PT  Pain and swelling decreasing with improved motion  Plan: Progress treatment as tolerated         Precautions: no active or passive wrist extension for 4 weeks       Manual       STM 15 15 15 15 15 15 15 15                  Mepiform       MAG      tubigrip  F F     KR     Custom SA wrist sothosis MAG                Exercise Diary       TGE ----> 2/3 2/3 2/3 2/3 23 2/3 23     AAROM wrist -----> MAG 2/10 2/10 2/10 2/10 2/10 2/10                    F/A   2/10 2/10 2/10 2/10 2/10 2/10                  thumb   2/10 2/10 2/10 2/10 2/10 2/10     Straight pegs     3R 3R 3R 3R     twister      20 20/20                                                                                                                                                                                               Modalities       HP/biph 15 15 15 15 15 15 15 15     US 12 12 12 12 12 12 12 12     CP 15 15 15 15 15 15 15 15

## 2019-08-06 ENCOUNTER — OFFICE VISIT (OUTPATIENT)
Dept: PHYSICAL THERAPY | Facility: CLINIC | Age: 69
End: 2019-08-06
Payer: MEDICARE

## 2019-08-06 DIAGNOSIS — S52.571A OTHER CLOSED INTRA-ARTICULAR FRACTURE OF DISTAL END OF RIGHT RADIUS, INITIAL ENCOUNTER: Primary | ICD-10-CM

## 2019-08-06 PROCEDURE — 97112 NEUROMUSCULAR REEDUCATION: CPT | Performed by: PHYSICAL THERAPIST

## 2019-08-06 PROCEDURE — 97140 MANUAL THERAPY 1/> REGIONS: CPT | Performed by: PHYSICAL THERAPIST

## 2019-08-06 PROCEDURE — 97035 APP MDLTY 1+ULTRASOUND EA 15: CPT | Performed by: PHYSICAL THERAPIST

## 2019-08-06 PROCEDURE — 97010 HOT OR COLD PACKS THERAPY: CPT | Performed by: PHYSICAL THERAPIST

## 2019-08-06 PROCEDURE — 97110 THERAPEUTIC EXERCISES: CPT | Performed by: PHYSICAL THERAPIST

## 2019-08-06 PROCEDURE — 97535 SELF CARE MNGMENT TRAINING: CPT | Performed by: PHYSICAL THERAPIST

## 2019-08-06 NOTE — PROGRESS NOTES
PT Re-Evaluation     Today's date: 2019  Patient name: Mayo Laguna  : 1950  MRN: [id number]  Referring provider: Makenna Zuleta MD  Dx:   Encounter Diagnosis     ICD-10-CM    1  Other closed intra-articular fracture of distal end of right radius, initial encounter S52 571A                   Assessment  Assessment details: Pt is a 80 YO female presenting to PT with pain, decreased AROM, strength and tolerance to activity  Pt would benefit from skilled intervention to address these issues and maximize overall function  Occupation- retired (part time )  Dominant- right; Involved- right  Pt continues to gain motion, decrease swelling and improve light functional activity  She is avoiding lifting or any resistance across her wrist area  Goals  ST  Decrease pain to 3-4 in 4 weeks            2  Decrease swelling in hand and wrist            3  Increase AROM to [facility] following protocol for DRF and radial artery repair            4  Maintain clean wound and promote healing            5   Provide orthotic for protection  LT  Increase functional motion and strength for independence with ADL and self care by DC            2  Ability to RTW and recreational activity by DC    Plan  Patient would benefit from: custom splinting and skilled physical therapy  Planned modality interventions: thermotherapy: hydrocollator packs, cryotherapy and ultrasound  Planned therapy interventions: manual therapy, neuromuscular re-education, compression and home exercise program  Frequency: 2x week  Duration in weeks: 4  Treatment plan discussed with: patient        Subjective Evaluation    History of Present Illness  Date of onset: 2019  Date of surgery: 2019  Mechanism of injury: Pt fell at home fracturing her right distal radius  Surgery to stabilize and repair radial artery    Pain  Current pain ratin  At best pain ratin  At worst pain rating: 3  Location: right wrist    Hand dominance: right    Treatments  Current treatment: physical therapy  Patient Goals  Patient goals for therapy: decreased edema, decreased pain, increased motion, increased strength, independence with ADLs/IADLs, return to sport/leisure activities and return to work          Objective     General Comments:      Wrist/Hand Comments  PT revised splint into extension  AROM right FA S/P- 60/80; Wrist E/F- 30/40                      Thumb MP- 0/40; IP- 0/30; digits- MP- 0/85;  PIP- 0/95; DIP- 0/65  Strength- un-assessed  Circumference at wrist- 19 0 cm; MPs- 19 5 cm; thumb P1- 7 8 cm; LF P1- 6 5 cm  Sensation- intact to LT all digits  Inspection- sutures out, slight scarring through incision              Precautions: no active or passive wrist extension for 4 weeks     Manual  7/8 7/11 7/17 7/18 7/23 7/25 7/30 8/1  8/6     STM 15 15 15 15 15 15 15 15  15                             Mepiform             MAG         tubigrip   F F         KR       Custom SA wrist sothosis MAG                           Exercise Diary  7/8 7/11 7/17 7/18 7/23 7/25 7/30 8/1 8/6     TGE ----> 2/3 2/3 2/3 2/3 2/3 2/3 2/3  2/3     AAROM wrist -----> MAG 2/10 2/10 2/10 2/10 2/10 2/10  2/10                    F/A     2/10 2/10 2/10 2/10 2/10 2/10  2/10                  thumb     2/10 2/10 2/10 2/10 2/10 2/10  2/10     Straight pegs         3R 3R 3R 3R  3R     twister         2/2 2/2 20/20 20/20  20/20                                                                                                                                                                                                                                                                                                                                                           Modalities  7/8 7/11 7/17 7/18 7/23 7/25 7/30 8/1  8/6     HP/biph 15 15 15 15 15 15 15 15  15     US 12 12 12 12 12 12 12 12  12     CP 15 15 15 15 15 15 15 15  15

## 2019-08-08 ENCOUNTER — OFFICE VISIT (OUTPATIENT)
Dept: PHYSICAL THERAPY | Facility: CLINIC | Age: 69
End: 2019-08-08
Payer: MEDICARE

## 2019-08-08 DIAGNOSIS — S52.571A OTHER CLOSED INTRA-ARTICULAR FRACTURE OF DISTAL END OF RIGHT RADIUS, INITIAL ENCOUNTER: Primary | ICD-10-CM

## 2019-08-08 PROCEDURE — 97035 APP MDLTY 1+ULTRASOUND EA 15: CPT | Performed by: PHYSICAL THERAPIST

## 2019-08-08 PROCEDURE — 97140 MANUAL THERAPY 1/> REGIONS: CPT | Performed by: PHYSICAL THERAPIST

## 2019-08-08 PROCEDURE — 97010 HOT OR COLD PACKS THERAPY: CPT | Performed by: PHYSICAL THERAPIST

## 2019-08-08 PROCEDURE — 97112 NEUROMUSCULAR REEDUCATION: CPT | Performed by: PHYSICAL THERAPIST

## 2019-08-08 PROCEDURE — 97110 THERAPEUTIC EXERCISES: CPT | Performed by: PHYSICAL THERAPIST

## 2019-08-08 NOTE — PROGRESS NOTES
Daily Note     Today's date: 2019  Patient name: Bre Harrison  : 1950  MRN: [id number]  Referring provider: Robyn Beaver MD  Dx:   Encounter Diagnosis     ICD-10-CM    1  Other closed intra-articular fracture of distal end of right radius, initial encounter S52 571A                   Subjective: Pt notes she is using her hand for more activity avoiding lifting or stretch  Objective: See treatment diary below    PT revised splint into extension  AROM right FA S/P- 60/80; Wrist E/F- 30/40                      Thumb MP- 0/40; IP- 0/30; digits- MP- 0/85; PIP- 0/95; DIP- 0/65  Strength- un-assessed  Circumference at wrist- 19 0 cm; MPs- 19 5 cm; thumb P1- 7 8 cm; LF P1- 6 5 cm  Sensation- intact to LT all digits  Inspection- sutures out, slight scarring through incision        Assessment: Tolerated treatment well  Patient would benefit from continued PT      Plan: Continue per plan of care        Precautions: no active or passive wrist extension for 4 weeks     Manual     STM 15 15 15 15 15 15 15 15  15  15                           Mepiform             MAG         tubigrip   F F         KR       Custom SA wrist sothosis MAG                           Exercise Diary     TGE ----> 2/3 2/3 2/3 2/3 2/3 2/3 2/3  2/3  2/3   AAROM wrist -----> MAG 2/10 2/10 2/10 2/10 2/10 2/10  2/10  2/10                  F/A     2/10 2/10 2/10 2/10 2/10 2/10  2/10  2/10                thumb     2/10 2/10 2/10 2/10 2/10 2/10  2/10  2/10   Straight pegs         3R 3R 3R 3R  3R  3R   twister          20/20 20/20  20/20  20/20    wristercizer                    3'    Dartthrowers                    2/10    ABC's S/P                    1x                                                                                                                                                                                                                                                                                 Modalities  7/8 7/11 7/17 7/18 7/23 7/25 7/30 8/1  8/6  8/8   HP/biph 15 15 15 15 15 15 15 15  15  15   US 12 12 12 12 12 12 12 12  12  12   CP 15 15 15 15 15 15 15 15  15  15

## 2019-08-14 ENCOUNTER — OFFICE VISIT (OUTPATIENT)
Dept: PHYSICAL THERAPY | Facility: CLINIC | Age: 69
End: 2019-08-14
Payer: MEDICARE

## 2019-08-14 DIAGNOSIS — S52.571A OTHER CLOSED INTRA-ARTICULAR FRACTURE OF DISTAL END OF RIGHT RADIUS, INITIAL ENCOUNTER: Primary | ICD-10-CM

## 2019-08-14 PROCEDURE — 97140 MANUAL THERAPY 1/> REGIONS: CPT | Performed by: PHYSICAL THERAPIST

## 2019-08-14 PROCEDURE — 97010 HOT OR COLD PACKS THERAPY: CPT | Performed by: PHYSICAL THERAPIST

## 2019-08-14 PROCEDURE — 97110 THERAPEUTIC EXERCISES: CPT | Performed by: PHYSICAL THERAPIST

## 2019-08-14 PROCEDURE — 97035 APP MDLTY 1+ULTRASOUND EA 15: CPT | Performed by: PHYSICAL THERAPIST

## 2019-08-14 PROCEDURE — 97535 SELF CARE MNGMENT TRAINING: CPT | Performed by: PHYSICAL THERAPIST

## 2019-08-14 PROCEDURE — 97112 NEUROMUSCULAR REEDUCATION: CPT | Performed by: PHYSICAL THERAPIST

## 2019-08-14 NOTE — PROGRESS NOTES
Daily Note     Today's date: 2019  Patient name: Aris Barros  : 1950  MRN: [id number]  Referring provider: Lorrie Holcomb MD  Dx:   Encounter Diagnosis     ICD-10-CM    1  Other closed intra-articular fracture of distal end of right radius, initial encounter S52 571A                   Subjective: pt notes she is using her hand for more activity without resistance  She has attempted driving, hostessing and light cleaning without increased pain  Objective: See treatment diary below  PT revised splint into extension  AROM right FA S/P- 60/80; Wrist E/F- 30/40                      Thumb MP- 0/40; IP- 0/30; digits- MP- 0/85; PIP- 0/95; DIP- 0/65  Strength- un-assessed  Circumference at wrist- 19 0 cm; MPs- 19 5 cm; thumb P1- 7 8 cm; LF P1- 6 5 cm  Sensation- intact to LT all digits  Inspection- sutures out, slight scarring through incision     Assessment: Tolerated treatment well  Patient would benefit from continued PT      Plan: Continue per plan of care        Precautions: no active or passive wrist extension for 4 weeks     Manual              STM 15 15 15 15 15 15 15 15  15  15                           Mepiform             MAG         tubigrip  1 F F         KR       Custom SA wrist sothosis                            Exercise Diary              TGE 23 2/3 2/3 2/3 2/3 2/3 2/3 2/3  2/3  2/3   AAROM wrist 2/10 MAG 2/10 2/10 2/10 2/10 2/10 2/10  2/10  2/10                  F/A  2/10   2/10 2/10 2/10 2/10 2/10 2/10  2/10  2/10                thumb  2/10   2/10 2/10 2/10 2/10 2/10 2/10  2/10  10   Straight pegs  3R       3R 3R 3R 3R  3R  3R   twister  2'/2'       2/2 2/ 20/20 20/20  20/20  20/20    wristercizer  3'                  3'    Dartthrowers  2/10                  2/10    ABC's S/P  1x                  1x    Cones  2x                                                                                                                                                                                                                                                                          Modalities  8/14            HP/biph 15 15 15 15 15 15 15 15  15  15   US 12 12 12 12 12 12 12 12  12  12   CP 15 15 15 15 15 15 15 15  15  15

## 2019-08-15 ENCOUNTER — OFFICE VISIT (OUTPATIENT)
Dept: PHYSICAL THERAPY | Facility: CLINIC | Age: 69
End: 2019-08-15
Payer: MEDICARE

## 2019-08-15 DIAGNOSIS — S52.571A OTHER CLOSED INTRA-ARTICULAR FRACTURE OF DISTAL END OF RIGHT RADIUS, INITIAL ENCOUNTER: Primary | ICD-10-CM

## 2019-08-15 PROCEDURE — 97140 MANUAL THERAPY 1/> REGIONS: CPT

## 2019-08-15 PROCEDURE — 97112 NEUROMUSCULAR REEDUCATION: CPT

## 2019-08-15 PROCEDURE — 97110 THERAPEUTIC EXERCISES: CPT

## 2019-08-15 PROCEDURE — 97010 HOT OR COLD PACKS THERAPY: CPT

## 2019-08-15 PROCEDURE — 97035 APP MDLTY 1+ULTRASOUND EA 15: CPT

## 2019-08-15 NOTE — PROGRESS NOTES
Daily Note     Today's date: 8/15/2019  Patient name: Reno Ashby  : 1950  MRN: [id number]  Referring provider: Birdie Herbert MD  Dx:   Encounter Diagnosis     ICD-10-CM    1  Other closed intra-articular fracture of distal end of right radius, initial encounter S52 571A                   Subjective: Pt  reports she is still sore, but she drove herself to therapy today, and she is able to make her bed now  Objective: See treatment diary below  PT revised splint into extension  AROM right FA S/P- 60/80; Wrist E/F- 30/40                      Thumb MP- 0/40; IP- 0/30; digits- MP- 0/85; PIP- 0/95; DIP- 0/65  Strength- un-assessed  Circumference at wrist- 19 0 cm; MPs- 19 5 cm; thumb P1- 7 8 cm; LF P1- 6 5 cm  Sensation- intact to LT all digits  Inspection- sutures out, slight scarring through incision         Assessment: Tolerated treatment well  Patient exhibited good technique with therapeutic exercises and would benefit from continued PT  Pt  continues to have limited A/PROM although improving  Plan: Progress treatment as tolerated  RE NV       Precautions: no active or passive wrist extension for 4 weeks     Manual  8/14 8/15           STM 15 15 15 15 15 15 15 15  15  15                           Mepiform             MAG         tubigrip  1  F         KR       Custom SA wrist sothosis                            Exercise Diary  8/14 8/15           TGE 2/3 2/3 2/3 2/3 2/3 2/3 2/3 2/3  2/3  2/3   AAROM wrist 2/10 2/10 2/10 2/10 2/10 2/10 2/10 2/10  2/10  2/10                  F/A  2/10  2/10 2/10 2/10 2/10 2/10 2/10 2/10  2/10  2/10                thumb  2/10  2/10 2/10 2/10 2/10 2/10 2/10 2/10  2/10  2/10   Straight pegs  3R  3R     3R 3R 3R 3R  3R  3R   twister  2'/2'  20     2/ 20/20 20/20  20/20  20/20    wristercizer  3'  3'                3'    Dartthrowers  2/10  2/10                2/10    ABC's S/P  1x  1x                1x    Cones  2x  2x                   Ball Roll    2'                                                                                                                                                                                                                                                 Modalities  8/14 8/15           HP/biph 15 15 15 15 15 15 15 15  15  15   US 12 12 12 12 12 12 12 12  12  12   CP 15 15 15 15 15 15 15 15  15  15

## 2019-08-20 ENCOUNTER — OFFICE VISIT (OUTPATIENT)
Dept: PHYSICAL THERAPY | Facility: CLINIC | Age: 69
End: 2019-08-20
Payer: MEDICARE

## 2019-08-20 DIAGNOSIS — S52.571A OTHER CLOSED INTRA-ARTICULAR FRACTURE OF DISTAL END OF RIGHT RADIUS, INITIAL ENCOUNTER: Primary | ICD-10-CM

## 2019-08-20 PROCEDURE — 97035 APP MDLTY 1+ULTRASOUND EA 15: CPT | Performed by: PHYSICAL THERAPIST

## 2019-08-20 PROCEDURE — 97535 SELF CARE MNGMENT TRAINING: CPT | Performed by: PHYSICAL THERAPIST

## 2019-08-20 PROCEDURE — 97110 THERAPEUTIC EXERCISES: CPT | Performed by: PHYSICAL THERAPIST

## 2019-08-20 PROCEDURE — 97140 MANUAL THERAPY 1/> REGIONS: CPT | Performed by: PHYSICAL THERAPIST

## 2019-08-20 PROCEDURE — 97112 NEUROMUSCULAR REEDUCATION: CPT | Performed by: PHYSICAL THERAPIST

## 2019-08-20 PROCEDURE — 97010 HOT OR COLD PACKS THERAPY: CPT | Performed by: PHYSICAL THERAPIST

## 2019-08-20 NOTE — PROGRESS NOTES
Daily Note     Today's date: 2019  Patient name: Merlynn Skiff  : 1950  MRN: [id number]  Referring provider: Nilda Colon MD  Dx:   Encounter Diagnosis     ICD-10-CM    1  Other closed intra-articular fracture of distal end of right radius, initial encounter S52 571A                   Subjective: pt is anxious to advance her program   She notes her right shoulder and arm are sore and fatigue due to overall weakness  Objective: See treatment diary below  PT weaning form her splint in favor of function  AROM right FA S/P- 50/80; Wrist E/F- 35/30                      Thumb MP- 0/40; IP- 0/30; digits- MP- 0/85; PIP- 0/95; DIP- 0/65  Strength-  II- ; 3 RODOLFO- 10/20Key- 12/15  Circumference at wrist- 18 5 cm; MPs- 19 5 cm; thumb P1- 7 8 cm; LF P1- 6 5 cm  Sensation- intact to LT all digits  Inspection- sutures out, slight scarring through incision     Assessment: Tolerated treatment well  Patient would benefit from continued PT      Plan: Continue per plan of care        Precautions: no active or passive wrist extension for 4 weeks     Manual  8/14 8/15 8/20          STM 15 15 15 15 15 15 15 15  15  15                           Mepiform             MAG         tubigrip  1  F         KR       Custom SA wrist sothosis                            Exercise Diary  8/14 8/15 8/20          TGE 2/3 2/3 2/3 2/3 2/3 2/3 2/3 2/3  2/3  2/3   AAROM wrist 2/10 2/10 2/10 2/10 2/10 2/10 2/10 2/10  2/10  2/10                  F/A  2/10  2/10 2/10 2/10 2/10 2/10 2/10 2/10  2/10  2/10                thumb  2/10  2/10 2/10 2/10 2/10 2/10 2/10 2/10  2/10  2/10   Straight pegs  3R  3R  3R  dc 3R 3R 3R 3R  3R  3R   twister  2'/2'  20/20  DC  dc 2/2 2/ 20/20 20/20  20/20  20/20    wristercizer  3'  3' 3"  dc            3'    Dartthrowers  2/10  2/10  2/10              2/10    ABC's S/P  1x  1x  1x              1x    Cones  2x  2x  2x dc               Ball Roll    2'  2' dc                TP         T 2'                TP pinch        T 2/10                Remy        20 2/10                Blue        II 2/10                DB E/F        1/2 2/10                S/P        -- 2/10                Flex Bar        Y 2/10                TB hi-lo        I 2/10                TB Tricep        I 2/10               TB ER/IR     I 2/10         TB lo row     I 2/10         TB Bicep     I 2/10                                         Modalities  8/14 8/15 8/20          HP/biph 15 15 15 15 15 15 15 15  15  15   US 12 12 12 12 12 12 12 12  12  12   CP 15 15 15 15 15 15 15 15  15  15

## 2019-08-22 ENCOUNTER — OFFICE VISIT (OUTPATIENT)
Dept: PHYSICAL THERAPY | Facility: CLINIC | Age: 69
End: 2019-08-22
Payer: MEDICARE

## 2019-08-22 DIAGNOSIS — S52.571A OTHER CLOSED INTRA-ARTICULAR FRACTURE OF DISTAL END OF RIGHT RADIUS, INITIAL ENCOUNTER: Primary | ICD-10-CM

## 2019-08-22 PROCEDURE — 97140 MANUAL THERAPY 1/> REGIONS: CPT

## 2019-08-22 PROCEDURE — 97010 HOT OR COLD PACKS THERAPY: CPT

## 2019-08-22 PROCEDURE — 97110 THERAPEUTIC EXERCISES: CPT

## 2019-08-22 PROCEDURE — 97035 APP MDLTY 1+ULTRASOUND EA 15: CPT

## 2019-08-22 PROCEDURE — 97112 NEUROMUSCULAR REEDUCATION: CPT

## 2019-08-22 NOTE — PROGRESS NOTES
Daily Note     Today's date: 2019  Patient name: Martin Boyle  : 1950  MRN: [id number]  Referring provider: Az Mills MD  Dx:   Encounter Diagnosis     ICD-10-CM    1  Other closed intra-articular fracture of distal end of right radius, initial encounter S52 571A                   Subjective: Pt  reports her wrist is starting to feel a little better since she is doing exercises  Objective: See treatment diary below  PT weaning form her splint in favor of function  AROM right FA S/P- 50/80; Wrist E/F- 35/30                      Thumb MP- 0/40; IP- 0/30; digits- MP- 0/85; PIP- 0/95; DIP- 0/65  Strength-  II- ; 3 RODOLFO- 10/20Key- 12/15  Circumference at wrist- 18 5 cm; MPs- 19 5 cm; thumb P1- 7 8 cm; LF P1- 6 5 cm  Sensation- intact to LT all digits  Inspection- sutures out, slight scarring through incision         Assessment: Tolerated treatment well  Patient exhibited good technique with therapeutic exercises and would benefit from continued PT  Noted decreased swelling and pain  ROM improving although remains limited  Plan: Progress treatment as tolerated         Precautions: no active or passive wrist extension for 4 weeks     Manual  8/14 8/15 8/20 8/22         STM 15 15 15 15 15 15 15 15  15  15                           Mepiform             MAG         tubigrip  1  F         KR       Custom SA wrist sothosis                            Exercise Diary  8/14 8/15 8/20 8/22         TGE 2/3 2/3 2/3 2/3 2/3 2/3 2/3 2/3  2/3  2/3   AAROM wrist 2/10 2/10 2/10 2/10 2/10 2/10 2/10 2/10  2/10  2/10                  F/A  2/10  2/10 2/10 2/10 2/10 2/10 2/10 2/10  2/10  2/10                thumb  2/10  2/10 2/10 2/10 2/10 2/10 2/10 2/10  2/10  2/10   Straight pegs  3R  3R  3R  dc 3R 3R 3R 3R  3R  3R   twister  2'/2'    DC  dc 2/2 2/  20    wristercizer  3'  3' 3"  dc            3'    Dartthrowers  2/10  2/10  2/10              2/10    ABC's S/P  1x  1x  1x              1x    Cones  2x  2x  2x dc               Ball Roll    2'  2' dc                TP         T 2'                TP pinch        T 2/10                Remy        20 2/10                Blue        IV 2/10                DB E/F       1# 2/10                S/P        1# 2/10                Flex Bar        Y 2/10                TB hi-lo        I 2/10                TB Tricep        I 2/10  II             TB ER/IR     I 2/10         TB lo row     I 2/10         TB Bicep     I 2/10 II                                        Modalities  8/14 8/15 8/20 8/22         HP/biph 15 15 15 15 15 15 15 15  15  15   US 12 12 12 12 12 12 12 12  12  12   CP 15 15 15 15 15 15 15 15  15  15

## 2019-08-27 ENCOUNTER — OFFICE VISIT (OUTPATIENT)
Dept: PHYSICAL THERAPY | Facility: CLINIC | Age: 69
End: 2019-08-27
Payer: MEDICARE

## 2019-08-27 DIAGNOSIS — S52.571A OTHER CLOSED INTRA-ARTICULAR FRACTURE OF DISTAL END OF RIGHT RADIUS, INITIAL ENCOUNTER: Primary | ICD-10-CM

## 2019-08-27 PROCEDURE — 97110 THERAPEUTIC EXERCISES: CPT | Performed by: PHYSICAL THERAPIST

## 2019-08-27 PROCEDURE — 97010 HOT OR COLD PACKS THERAPY: CPT | Performed by: PHYSICAL THERAPIST

## 2019-08-27 PROCEDURE — 97112 NEUROMUSCULAR REEDUCATION: CPT | Performed by: PHYSICAL THERAPIST

## 2019-08-27 PROCEDURE — 97035 APP MDLTY 1+ULTRASOUND EA 15: CPT | Performed by: PHYSICAL THERAPIST

## 2019-08-27 PROCEDURE — 97140 MANUAL THERAPY 1/> REGIONS: CPT | Performed by: PHYSICAL THERAPIST

## 2019-08-27 NOTE — PROGRESS NOTES
PT Re-Evaluation     Today's date: 2019  Patient name: Bre Harrison  : 1950  MRN: [id number]  Referring provider: Robyn Beaver MD  Dx:   Encounter Diagnosis     ICD-10-CM    1  Other closed intra-articular fracture of distal end of right radius, initial encounter S52 571A                   Assessment  Assessment details: Pt is a 78 YO female presenting to PT with pain, decreased AROM, strength and tolerance to activity  Pt would benefit from skilled intervention to address these issues and maximize overall function  Occupation- retired (part time )  Dominant- right; Involved- right  Pt continues to gain motion, decrease swelling and improve light functional activity  She has been progressing with strength for home activity and self care    Goals  ST  Decrease pain to 3-4 in 4 weeks            2  Decrease swelling in hand and wrist            3  Increase AROM to [facility] following protocol for DRF and radial artery repair            4  Maintain clean wound and promote healing            5   Provide orthotic for protection  LT  Increase functional motion and strength for independence with ADL and self care by DC            2  Ability to RTW and recreational activity by DC    Plan  Patient would benefit from: custom splinting and skilled physical therapy  Planned modality interventions: thermotherapy: hydrocollator packs, cryotherapy and ultrasound  Planned therapy interventions: manual therapy, neuromuscular re-education, home exercise program, therapeutic exercise and stretching  Frequency: 2x week  Duration in weeks: 4  Treatment plan discussed with: patient        Subjective Evaluation    History of Present Illness  Date of onset: 2019  Date of surgery: 2019  Mechanism of injury: Pt fell at home fracturing her right distal radius  Surgery to stabilize and repair radial artery    Pain  Current pain ratin  At best pain ratin  At worst pain rating: 3  Location: right wrist    Hand dominance: right    Treatments  Current treatment: physical therapy  Patient Goals  Patient goals for therapy: decreased edema, decreased pain, increased motion, increased strength, independence with ADLs/IADLs, return to sport/leisure activities and return to work          Objective     General Comments:      Wrist/Hand Comments  PT revised splint into extension  AROM right FA S/P- 50/80; Wrist E/F- 40/35                      Thumb MP- 0/40; IP- 0/30; digits- MP- 0/85;  PIP- 0/95; DIP- 0/65  Strength-  II- 15/55; 3 RODOLFO- 10/20; Key- 10/15  Circumference at wrist- 19 0 cm; MPs- 18 5 cm; thumb P1- 7 6 cm; LF P1- 6 5 cm  Sensation- intact to LT all digits  Inspection- mild swelling; using Mepiform for scar softening             Precautions:progress strengthening within safety considerations    Manual  8/14 8/15 8/20 8/22  8/27             STM 15 15 15 15 15 15 15 15  15  15                           Mepiform             MAG         tubigrip  1   F         KR       Custom SA wrist sothosis                             Exercise Diary  8/14 8/15 8/20 8/22  8/27             TGE 2/3 2/3 2/3 2/3 2/3 2/3 2/3 2/3  2/3  2/3   AAROM wrist 2/10 2/10 2/10 2/10 2/10 2/10 2/10 2/10  2/10  2/10                  F/A  2/10  2/10 2/10 2/10 2/10 2/10 2/10 2/10  2/10  2/10                thumb  2/10  2/10 2/10 2/10 2/10 2/10 2/10 2/10  2/10  2/10    Dartthrowers  2/10  2/10  2/10    2/10          2/10    TP       T 2'  T 2'  T 2'              TP pinch      T 2/10  T 2/10  T 2/10              Remy      20 2/10  20 2/10  20 2/10              Blue      IV 2/10  IV 2/10 IV 2/10              DB E/F      1 2/10 1# 2/10  2 2/10              S/P      1 2/10  1# 2/10                Flex Bar      Y 2/I 2/1010  Y 2/10                TB hi-lo      I 2/10  I 2/10  I 2/10              TB Tricep      I 2/10  I 2/10  II 2/10             TB ER/IR      I 2/10  I 2/10  II 2/10             TB lo row      I 2/10  I 2/10  I 2/10             TB Bicep      I 2/10  I 2/10 II 2/10                                                                   Modalities  8/14 8/15 8/20 8/22  8/27             HP/biph 15 15 15 15 15 15 15 15  15  15   US 12 12 12 12 12 12 12 12  12  12   CP 15 15 15 15 15 15 15 15  15  15

## 2019-08-29 ENCOUNTER — OFFICE VISIT (OUTPATIENT)
Dept: PHYSICAL THERAPY | Facility: CLINIC | Age: 69
End: 2019-08-29
Payer: MEDICARE

## 2019-08-29 DIAGNOSIS — S52.571A OTHER CLOSED INTRA-ARTICULAR FRACTURE OF DISTAL END OF RIGHT RADIUS, INITIAL ENCOUNTER: Primary | ICD-10-CM

## 2019-08-29 PROCEDURE — 97035 APP MDLTY 1+ULTRASOUND EA 15: CPT | Performed by: PHYSICAL THERAPIST

## 2019-08-29 PROCEDURE — 97110 THERAPEUTIC EXERCISES: CPT | Performed by: PHYSICAL THERAPIST

## 2019-08-29 PROCEDURE — 97010 HOT OR COLD PACKS THERAPY: CPT | Performed by: PHYSICAL THERAPIST

## 2019-08-29 PROCEDURE — 97112 NEUROMUSCULAR REEDUCATION: CPT | Performed by: PHYSICAL THERAPIST

## 2019-08-29 PROCEDURE — 97535 SELF CARE MNGMENT TRAINING: CPT | Performed by: PHYSICAL THERAPIST

## 2019-08-29 PROCEDURE — 97140 MANUAL THERAPY 1/> REGIONS: CPT | Performed by: PHYSICAL THERAPIST

## 2019-08-29 NOTE — PROGRESS NOTES
Daily Note     Today's date: 2019  Patient name: Grant Delarosa  : 1950  MRN: [id number]  Referring provider: Warren Ulloa MD  Dx:   Encounter Diagnosis     ICD-10-CM    1  Other closed intra-articular fracture of distal end of right radius, initial encounter S52 571A                   Subjective: pt is noting continued weakness and lack of full functional motion when using her hand for home activity  She notes proximal strengthening is helping her shoulder pain, especially at night      Objective: See treatment diary below  Pt is using her splint only for heavy acitvity  AROM right FA S/P- 50/80; Wrist E/F- 40/35                      Thumb MP- 0/40; IP- 0/30; digits- MP- 0/85; PIP- 0/95; DIP- 0/65  Strength-  II- 15; 3 RODOLFO- 10/20; Key- 10/15  Circumference at wrist- 19 0 cm; MPs- 18 5 cm; thumb P1- 7 6 cm; LF P1- 6 5 cm  Sensation- intact to LT all digits  Inspection- mild swelling; using Mepiform for scar softening       Assessment: Tolerated treatment well  Patient would benefit from continued PT      Plan: Continue per plan of care        Precautions:progress strengthening within safety considerations    Manual  8/14 8/15 8/20 8/22  8/27  8/29           STM 15 15 15 15 15 15 15 15  15  15    TB            II           Mepiform            KR MAG         tubigrip  1   F      MAG   KR       Custom SA wrist sothosis                             Exercise Diary  8/14 8/15 8/20 8/22  8/27  8/29           TGE 2/3 2/3 2/3 2/3 2/3 2/3 2/3 2/3  2/3  2/3   AAROM wrist 2/10 2/10 2/10 2/10 2/10 2/10 2/10 2/10  2/10  2/10                  F/A  2/10  2/10 2/10 2/10 2/10 2/10 2/10 2/10  2/10  2/10                thumb  2/10  2/10 2/10 2/10 2/10 2/10 2/10 2/10  2/10  2/10    Dartthrowers  2/10  2/10  2/10    2/10  2/10        2/10    TP       T 2'  T 2'  T 2'  T 2'            TP pinch      T 2/10  T 2/10  T 2/10  T 2/10            Remy      20 2/10  20 2/10  20 2/10  20 2/10            Blue      IV 2/10  IV 2/10 IV 2/10  IV 2/10            DB E/F      1 2/10 1# 2/10  1 2/10  1 2/10            S/P      1 2/10  1# 2/10  1 2/10  1 2/10            Flex Bar      Y 2/I 2/1010  Y 2/10 Y 2/10  Y 2/10            TB hi-lo      I 2/10  I 2/10  I 2/10  II 2/10            TB Tricep      I 2/10  I 2/10  II 2/10  II 2/10           TB ER/IR      I 2/10  I 2/10  II 2/10  II 2/10           TB lo row      I 2/10  I 2/10  I 2/10  II 2/10           TB Bicep      I 2/10  I 2/10 II 2/10  II 2/10                                                                 Modalities  8/14 8/15 8/20 8/22  8/27  8/29           HP/biph 15 15 15 15 15 15 15 15  15  15   US 12 12 12 12 12 12 12 12  12  12   CP 13 15 13 15 15 15 15 15  15  15

## 2019-08-30 ENCOUNTER — APPOINTMENT (OUTPATIENT)
Dept: RADIOLOGY | Facility: CLINIC | Age: 69
End: 2019-08-30
Payer: MEDICARE

## 2019-08-30 ENCOUNTER — OFFICE VISIT (OUTPATIENT)
Dept: OBGYN CLINIC | Facility: CLINIC | Age: 69
End: 2019-08-30

## 2019-08-30 VITALS
BODY MASS INDEX: 32.07 KG/M2 | DIASTOLIC BLOOD PRESSURE: 4 MMHG | HEIGHT: 70 IN | SYSTOLIC BLOOD PRESSURE: 12 MMHG | WEIGHT: 224 LBS

## 2019-08-30 DIAGNOSIS — Z48.89 AFTERCARE FOLLOWING SURGERY: ICD-10-CM

## 2019-08-30 DIAGNOSIS — Z48.89 AFTERCARE FOLLOWING SURGERY: Primary | ICD-10-CM

## 2019-08-30 PROCEDURE — 73110 X-RAY EXAM OF WRIST: CPT

## 2019-08-30 PROCEDURE — 1124F ACP DISCUSS-NO DSCNMKR DOCD: CPT | Performed by: ORTHOPAEDIC SURGERY

## 2019-08-30 PROCEDURE — 99024 POSTOP FOLLOW-UP VISIT: CPT | Performed by: ORTHOPAEDIC SURGERY

## 2019-08-30 NOTE — PROGRESS NOTES
CHIEF COMPLAINT:  Chief Complaint   Patient presents with    Right Wrist - Post-op       SUBJECTIVE:  Jordan Hendricks is a 79y o  year old RHD female who presents for follow-up regarding right distal radius ORIF and right radial artery repair performed on 2019  Patient has been in her splint  She has been taking her daily baby aspirin due to radial artery repair  She has been working with OT on her range of motion  She does have some stiffness with flexion extension at her wrist   She denies any numbness or tingling  PAST MEDICAL HISTORY:  Past Medical History:   Diagnosis Date    Arthritis     generalized    Fractures     High cholesterol     Irritable bowel syndrome     occasional bouts of it but not bad       PAST SURGICAL HISTORY:  Past Surgical History:   Procedure Laterality Date    CERVICAL BIOPSY  W/ LOOP ELECTRODE EXCISION  ?     COLONOSCOPY      WY OPEN TX RADIAL & ULNAR SHAFT FX FIX RADIUS OR ULNA Right 2019    Procedure: OPEN REDUCTION W/ INTERNAL FIXATION (ORIF) RADIUS / ULNA (WRIST) right;  Surgeon: Carlene Najera MD;  Location: [facility] MAIN OR;  Service: Orthopedics    WISDOM TOOTH EXTRACTION         FAMILY HISTORY:  Family History   Problem Relation Age of Onset    No Known Problems Mother     Heart disease Father     No Known Problems Sister     No Known Problems Sister     No Known Problems Sister        SOCIAL HISTORY:  Social History     Tobacco Use    Smoking status: Former Smoker     Last attempt to quit: 1972     Years since quittin 6    Smokeless tobacco: Never Used   Substance Use Topics    Alcohol use: Yes     Frequency: 4 or more times a week     Drinks per session: 1 or 2     Binge frequency: Never     Comment: occasionally    Drug use: Never       MEDICATIONS:    Current Outpatient Medications:     ascorbic acid (VITAMIN C) 500 mg tablet, Take 500 mg by mouth every morning , Disp: , Rfl:     atorvastatin (LIPITOR) 10 mg tablet, Take 10 mg by mouth 3 (three) times a week , Disp: , Rfl:     calcium carbonate (OS-MICHAEL) 600 MG tablet, Take 600 mg by mouth daily, Disp: , Rfl:     fluticasone (FLONASE) 50 mcg/act nasal spray, 1 spray into each nostril every morning , Disp: , Rfl:     montelukast (SINGULAIR) 10 mg tablet, Take 10 mg by mouth daily at bedtime , Disp: , Rfl:     Multiple Vitamins-Minerals (CENTRUM PO), Take 1 caplet by mouth every morning, Disp: , Rfl:     Omega-3 Fatty Acids (FISH OIL) 1200 MG CAPS, Take 1,200 mg by mouth every morning , Disp: , Rfl:     aspirin (ECOTRIN) 325 mg EC tablet, Take 1 tablet (325 mg total) by mouth daily for 14 doses, Disp: 14 tablet, Rfl: 0    ALLERGIES:  Allergies   Allergen Reactions    Mobic [Meloxicam] Rash    Tetracycline Rash       REVIEW OF SYSTEMS:  Review of Systems  ROS:   General: no fever, no chills  HEENT:  No loss of hearing or eyesight problems  Eyes:  No red eyes  Respiratory:  No coughing, shortness of breath or wheezing  Cardiovascular:  No chest pain, no palpitations  GI:  Abdomen soft nontender, denies nausea  Endocrine:  No muscle weakness, no frequent urination, no excessive thirst  Urinary:  No dysuria, no incontinence  Musculoskeletal: see HPI and PE  SKIN:  No skin rash, no dry skin  Neurological:  No headaches, no confusion  Psychiatric:  No suicide thoughts, no anxiety, no depression  Review of all other systems is negative    VITALS:  Vitals:    08/30/19 0821   BP: (!) 12/4       LABS:  HgA1c: No results found for: HGBA1C  BMP: No results found for: GLUCOSE, CALCIUM, NA, K, CO2, CL, BUN, CREATININE    _____________________________________________________  PHYSICAL EXAMINATION:  General: well developed and well nourished, alert, oriented times 3 and appears comfortable  Psychiatric: Normal  HEENT: Trachea Midline, No torticollis  Pulmonary: No audible wheezing or respiratory distress   Skin: No masses, erythema, lacerations, fluctation, ulcerations  Neurovascular: Sensation Intact to the Median, Ulnar, Radial Nerve, Motor Intact to the Median, Ulnar, Radial Nerve and Pulses Intact    MUSCULOSKELETAL EXAMINATION:  Right wrist  Mild edema noted over the distal radius, no erythema or ecchymosis noted  Incision is well healed with no signs or symptoms of infection  She has decreased range of motion with flexion and extension which is expected postoperatively  Patient is neurovascularly intact    ___________________________________________________  STUDIES REVIEWED:  Images obtained today of the Right wrist 3 views demonstrate Hardware in good alignment with no evidence of loosening  Fracture in good alignment with callus formation  PROCEDURES PERFORMED:  Procedures  No Procedures performed today    _____________________________________________________  ASSESSMENT/PLAN:      Seven weeks S/P right distal radius ORIF and right radial artery repair  - patient and continue work on physical therapy to work on range of motion  She is given a script for aggressive range of motion as well as to obtain DynaSplint for flexion extension  - she can stop taking a baby aspirin at this time  - we will have her follow up in 8 weeks for re-evaluation of her range of motion    Follow Up:  Return in about 8 weeks (around 10/25/2019)      Work/school status:  No restrictions    To Do Next Visit:  Re-evaluation of current issue      Scribe Attestation    I,:   Star Claudio PA-C am acting as a scribe while in the presence of the attending physician :        I,:   Cat No MD personally performed the services described in this documentation    as scribed in my presence :

## 2019-09-04 ENCOUNTER — OFFICE VISIT (OUTPATIENT)
Dept: PHYSICAL THERAPY | Facility: CLINIC | Age: 69
End: 2019-09-04
Payer: MEDICARE

## 2019-09-04 DIAGNOSIS — S52.571A OTHER CLOSED INTRA-ARTICULAR FRACTURE OF DISTAL END OF RIGHT RADIUS, INITIAL ENCOUNTER: Primary | ICD-10-CM

## 2019-09-04 PROCEDURE — 97535 SELF CARE MNGMENT TRAINING: CPT | Performed by: PHYSICAL THERAPIST

## 2019-09-04 PROCEDURE — 97112 NEUROMUSCULAR REEDUCATION: CPT | Performed by: PHYSICAL THERAPIST

## 2019-09-04 PROCEDURE — 97010 HOT OR COLD PACKS THERAPY: CPT | Performed by: PHYSICAL THERAPIST

## 2019-09-04 PROCEDURE — 97110 THERAPEUTIC EXERCISES: CPT | Performed by: PHYSICAL THERAPIST

## 2019-09-04 PROCEDURE — 97140 MANUAL THERAPY 1/> REGIONS: CPT | Performed by: PHYSICAL THERAPIST

## 2019-09-04 NOTE — PROGRESS NOTES
Daily Note     Today's date: 2019  Patient name: Merlynn Skiff  : 1950  MRN: [id number]  Referring provider: Nilda Colon MD  Dx:   Encounter Diagnosis     ICD-10-CM    1  Other closed intra-articular fracture of distal end of right radius, initial encounter S52 571A                   Subjective: pt was seen by her physician with notation of slowly healing radial artery  She cont to have decreased capillary refill with moderate swelling and limited wrist and FA motion  Objective: See treatment diary below    Pt is using her splint only for heavy activity, weaned to a wrist wrap for general activity  Dr  also requested a dynamic wrist E/F for home  AROM right FA S/P- 50/80; Wrist E/F- 40/35                      Thumb MP- 0/40; IP- 0/30; digits- MP- 0/85; PIP- 0/95; DIP- 0/65  Strength-  II- 15/; 3 RODOLFO- 10/20; Key- 10/15  Circumference at wrist- 19 0 cm; MPs- 18 5 cm; thumb P1- 7 6 cm; LF P1- 6 5 cm  Sensation- intact to LT all digits  Inspection- mild swelling; using Mepiform for scar softening    Assessment: Tolerated treatment well  Patient would benefit from continued PT      Plan: Continue per plan of care        Precautions:progress strengthening within safety considerations    Manual  8/14 8/15 8/20 8/22  8/27  8/29  9/4         STM 15 15 15 15 15 15 15 15  15  15    TB            II           Mepiform            KR          Wrist wrap           MAG        Custom SA wrist sothosis                             Exercise Diary  8/14 8/15 8/20 8/22  8/27  8/29  9/4         TGE 2/3 2/3 2/3 2/3 2/3 2/3  2/3  2/3  2/3   AAROM wrist 2/10 2/10 2/10 2/10 2/10 2/10  2/10  2/10  2/10                  F/A  2/10  2/10 2/10 2/10 2/10 2/10  2/10  2/10  2/10                thumb  2/10  2/10 2/10 2/10 2/10 2/10  2/10  2/10  2/10    Dartthrowers  2/10  2/10  2/10    2/10  2/10        2/10    TP       T 2'  T 2'  T 2'  T 2'  T 2'          TP zane      T 2/10  T 2/10  T 2/10  T 2/10  T 2/10          Remy      20 2/10  20 2/10  20 2/10  20 2/10  20 2/10          Blue      IV 2/10  IV 2/10 IV 2/10  IV 2/10  IV 2/10          DB E/F      1 2/10 1# 2/10  1 2/10  1 2/10  1 2/10          S/P      1 2/10  1# 2/10  1 2/10  1 2/10  1 2/10          Flex Bar      Y 2/I 2/1010  Y 2/10 Y 2/10  Y 2/10  Y 2/10          TB hi-lo      I 2/10  I 2/10  I 2/10  II 2/10  II 2/10          TB Tricep      I 2/10  I 2/10  II 2/10  II 2/10  II 2/10         TB ER/IR      I 2/10  I 2/10  II 2/10  II 2/10  II 2/10         TB lo row      I 2/10  I 2/10  I 2/10  II 2/10  II 2/10         TB Bicep      I 2/10  I 2/10 II 2/10  II 2/10  II 2/10                                 Comp             Y 3'  Y 3'               Modalities  8/14 8/15 8/20 8/22 8/27 8/29  9/4         HP/biph 15 15 15 15 15 15 15 15  15  15   US 12 12 12 12 12 12 12 12  12  12   CP 13 15 13 15 13 15 15 15  15  15

## 2019-09-05 ENCOUNTER — OFFICE VISIT (OUTPATIENT)
Dept: PHYSICAL THERAPY | Facility: CLINIC | Age: 69
End: 2019-09-05
Payer: MEDICARE

## 2019-09-05 DIAGNOSIS — S52.571A OTHER CLOSED INTRA-ARTICULAR FRACTURE OF DISTAL END OF RIGHT RADIUS, INITIAL ENCOUNTER: Primary | ICD-10-CM

## 2019-09-05 PROCEDURE — 97140 MANUAL THERAPY 1/> REGIONS: CPT | Performed by: PHYSICAL THERAPIST

## 2019-09-05 PROCEDURE — 97112 NEUROMUSCULAR REEDUCATION: CPT | Performed by: PHYSICAL THERAPIST

## 2019-09-05 PROCEDURE — 97110 THERAPEUTIC EXERCISES: CPT | Performed by: PHYSICAL THERAPIST

## 2019-09-05 PROCEDURE — 97010 HOT OR COLD PACKS THERAPY: CPT | Performed by: PHYSICAL THERAPIST

## 2019-09-05 PROCEDURE — 97035 APP MDLTY 1+ULTRASOUND EA 15: CPT | Performed by: PHYSICAL THERAPIST

## 2019-09-05 NOTE — PROGRESS NOTES
Daily Note     Today's date: 2019  Patient name: Aris Barros  : 1950  MRN: [id number]  Referring provider: Lorrie Holcomb MD  Dx:   Encounter Diagnosis     ICD-10-CM    1  Other closed intra-articular fracture of distal end of right radius, initial encounter S52 571A                   Subjective: pt notes more ease in motion but still feels subtle motions are diminished  Objective: See treatment diary below    Pt is using her splint only for heavy activity, weaned to a wrist wrap for general activity  Dr  also requested a dynamic wrist E/F for home  AROM right FA S/P- 50/80; Wrist E/F- 40/35                      Thumb MP- 0/40; IP- 0/30; digits- MP- 0/85; PIP- 0/95; DIP- 0/65  Strength-  II- 15; 3 RODOLFO- 10/20; Key- 10/15  Circumference at wrist- 19 0 cm; MPs- 18 5 cm; thumb P1- 7 6 cm; LF P1- 6 5 cm  Sensation- intact to LT all digits  Inspection- mild swelling; using Mepiform for scar softening    Assessment: Tolerated treatment well  Patient would benefit from continued PT      Plan: Continue per plan of care        Precautions:progress strengthening within safety considerations    Manual  8/14 8/15 8/20 8/22  8/27  8/29  9/4  9       STM 15 15 15 15 15 15 15 15  15  15    TB            II           Mepiform            KR          Wrist wrap           MAG        Custom SA wrist sothosis                             Exercise Diary  8/14 8/15 8/20 8/22  8/27  8/29  9  9       TGE 23 23 2/3 2/3 23 23       AAROM wrist 2/10 2/10 2/10 2/10 2/10 2/10                      F/A  2/10  2/10 2/10 2/10 2/10 2/10                    thumb  2/10  2/10 2/10 2/10 2/10 2/10        Dartthrowers  2/10  2/10  2/10    2/10  2/10         TP       T 2'  T 2'  T 2'  T 2'  T 2'  T 2'        TP pinch      T 2/10  T /10  T /10  T /10  T /10  T /10        Remy      20 2/10  20 2/10  20 2/10  20 2/10  20 /10  20 2/10        Blue      IV /10  IV 10 IV 10  IV /10  IV /10  IV 2/10      DB E/F      1 2/10 1# 2/10  1 2/10  1 2/10  1 2/10  1 2/10        S/P      1 2/10  1# 2/10  1 2/10  1 2/10  1 2/10  1 2/10        Flex Bar      Y 2/I 2/1010  Y 2/10 Y 2/10  Y 2/10  Y 2/10  Y 2/10        TB hi-lo      I 2/10  I 2/10  I 2/10  II 2/10  II 2/10  II 2/10        TB Tricep      I 2/10  I 2/10  II 2/10  II 2/10  II 2/10  II 2/10       TB ER/IR      I 2/10  I 2/10  II 2/10  II 2/10  II 2/10  II 2/10       TB lo row      I 2/10  I 2/10  I 2/10  II 2/10  II 2/10  II 2/10       TB Bicep      I 2/10  I 2/10 II 2/10  II 2/10  II 2/10  II 2/10                               Comp             Y 3'  Y 3'  Y 3'             Modalities  8/14 8/15 8/20 8/22 8/27 8/29  9/4  9/5       HP/biph 15 15 15 15 15 15 15 15  15  15   US 12 12 12 12 12 12 12 12  12  12   CP 13 15 13 15 13 15 15 15  15  15

## 2019-09-10 ENCOUNTER — OFFICE VISIT (OUTPATIENT)
Dept: PHYSICAL THERAPY | Facility: CLINIC | Age: 69
End: 2019-09-10
Payer: MEDICARE

## 2019-09-10 DIAGNOSIS — S52.571A OTHER CLOSED INTRA-ARTICULAR FRACTURE OF DISTAL END OF RIGHT RADIUS, INITIAL ENCOUNTER: Primary | ICD-10-CM

## 2019-09-10 PROCEDURE — 97010 HOT OR COLD PACKS THERAPY: CPT | Performed by: PHYSICAL THERAPIST

## 2019-09-10 PROCEDURE — 97112 NEUROMUSCULAR REEDUCATION: CPT | Performed by: PHYSICAL THERAPIST

## 2019-09-10 PROCEDURE — 97110 THERAPEUTIC EXERCISES: CPT | Performed by: PHYSICAL THERAPIST

## 2019-09-10 PROCEDURE — 97140 MANUAL THERAPY 1/> REGIONS: CPT | Performed by: PHYSICAL THERAPIST

## 2019-09-10 PROCEDURE — 97035 APP MDLTY 1+ULTRASOUND EA 15: CPT | Performed by: PHYSICAL THERAPIST

## 2019-09-10 NOTE — PROGRESS NOTES
Daily Note     Today's date: 9/10/2019  Patient name: Maria Eugenia Marinelli  : 1950  MRN: [id number]  Referring provider: Jenny Julio MD  Dx:   Encounter Diagnosis     ICD-10-CM    1  Other closed intra-articular fracture of distal end of right radius, initial encounter S52 573F                   Subjective: pt feels stronger as she increases her activity level at home    Objective: See treatment diary below      Pt is using her splint only for heavy activity, weaned to a wrist wrap for general activity   Dr  also requested a dynamic wrist E/F for home     AROM right FA S/P- 50/80; Wrist E/F- 40/35                      Thumb MP- 0/40; IP- 0/30; digits- MP- 0/85; PIP- 0/95; DIP- 0/65  Strength-  II- ;  3 RODOLFO- 10/18; Key-   Circumference at wrist- 19 0 cm; MPs- 18 5 cm; thumb P1- 7 6 cm; LF P1- 6 5 cm  Sensation- intact to LT all digits  Inspection- mild swelling; using Mepiform for scar softening       Assessment: Tolerated treatment well  Patient would benefit from continued PT      Plan: Continue per plan of care        Precautions:progress strengthening within safety considerations    Manual  8/14 8/15 8/20 8/22  8/27  8/29  9/4  9/5  9/10     STM 15 15 15 15 15 15 15 15  15  15    TB            II           Mepiform            KR          Wrist wrap           MAG        Custom SA wrist sothosis                             Exercise Diary  8/14 8/15 8/20 8/22  8/27  8/29  9/4  9/5  9/10     TGE 2/3 2/3 2/3 2/3 2/3 23       AAROM wrist 2/10 2/10 2/10 2/10 2/10 2/10                      F/A  2/10  2/10 2/10 2/10 2/10 2/10                    thumb  2/10  2/10 2/10 2/10 2/10 2/10        Dartthrowers  2/10  2/10  2/10    2/10  2/10         TP       T 2'  T 2'  T 2'  T 2'  T 2'  T 2'  T 2'      TP pinch      T 2/10  T 2/10  T /10  T 10  T /10  T 2/10  T 2/10      Remy      20 2/10  20 2/10  20 2/10  20 2/10  20 2/10  20 2/10  20 2/10      Blue      IV 2/10  IV /10 IV /10  IV /10  IV 10  IV 2/10  IV 2/10      DB E/F      1 2/10 1# 2/10  1 2/10  1 2/10  1 2/10  1 2/10  2 2/10      S/P      1 2/10  1# 2/10  1 2/10  1 2/10  1 2/10  1 2/10  1 2/10      Flex Bar      Y 2/I 2/1010  Y 2/10 Y 2/10  Y 2/10  Y 2/10  Y 2/10  R 2/10      TB hi-lo      I 2/10  I 2/10  I 2/10  II 2/10  II 2/10  II 2/10  III 2/10      TB Tricep      I 2/10  I 2/10  II 2/10  II 2/10  II 2/10  II 2/10  III 2/10     TB ER/IR        Mid Row      I 2/10  I 2/10  II 2/10  II 2/10  II 2/10  II 2/10  III 2/10     TB lo row      I 2/10  I 2/10  I 2/10  II 2/10  II 2/10  II 2/10  III 2/10     TB Bicep      I 2/10  I 2/10 II 2/10  II 2/10  II 2/10  II 2/10  III 2/10     TB D2                  II 2/10     Comp             Y 3'  Y 3'  Y 3'  Y 3'           Modalities  8/14 8/15 8/20 8/22  8/27  8/29  9/4  9/5  9/10     HP/biph 15 15 15 15 15 15 15 15  15  15   US 12 12 12 12 12 12 12 12  12  12   CP 15 15 15 15 15 15 15 15  15  15

## 2019-09-12 ENCOUNTER — OFFICE VISIT (OUTPATIENT)
Dept: PHYSICAL THERAPY | Facility: CLINIC | Age: 69
End: 2019-09-12
Payer: MEDICARE

## 2019-09-12 DIAGNOSIS — S52.571A OTHER CLOSED INTRA-ARTICULAR FRACTURE OF DISTAL END OF RIGHT RADIUS, INITIAL ENCOUNTER: Primary | ICD-10-CM

## 2019-09-12 PROCEDURE — 97112 NEUROMUSCULAR REEDUCATION: CPT | Performed by: PHYSICAL THERAPIST

## 2019-09-12 PROCEDURE — 97140 MANUAL THERAPY 1/> REGIONS: CPT | Performed by: PHYSICAL THERAPIST

## 2019-09-12 PROCEDURE — 97110 THERAPEUTIC EXERCISES: CPT | Performed by: PHYSICAL THERAPIST

## 2019-09-12 PROCEDURE — 97010 HOT OR COLD PACKS THERAPY: CPT | Performed by: PHYSICAL THERAPIST

## 2019-09-12 NOTE — PROGRESS NOTES
Daily Note     Today's date: 2019  Patient name: Mayo Laguna  : 1950  MRN: [id number]  Referring provider: Makenna Zuleta MD  Dx:   Encounter Diagnosis     ICD-10-CM    1  Other closed intra-articular fracture of distal end of right radius, initial encounter S52 571A                   Subjective: pt is gaining motion with less discomfort when active      Objective: See treatment diary below    Pt is using her splint only for heavy activity, weaned to a wrist wrap for general activity   Dr  also requested a dynamic wrist E/F for home     AROM right FA S/P- 50/80; Wrist E/F- 40/35                      Thumb MP- 0/40; IP- 0/30; digits- MP- 0/85; PIP- 0/95; DIP- 0/65  Strength-  II- ;  3 RODOLFO- 10/18; Key-   Circumference at wrist- 19 0 cm; MPs- 18 5 cm; thumb P1- 7 6 cm; LF P1- 6 5 cm  Sensation- intact to LT all digits  Inspection- mild swelling; using Mepiform for scar softening    Assessment: Tolerated treatment well  Patient would benefit from continued PT      Plan: Continue per plan of care        Precautions:progress strengthening within safety considerations    Manual  8/14 8/15 8/20 8/22  8/27  8/29  9/4  9/5  9/10  9/12   STM 15 15 15 15 15 15 15 15  15  15    TB            II           Mepiform            KR          Wrist wrap           MAG        Custom SA wrist sothosis                             Exercise Diary  8/14 8/15 8/20 8/22  8/27  8/29  9/4  9/5  9/10  9/12   TGE 2/3 2/3 2/3 2/3 2/3 23       AAROM wrist 2/10 2/10 2/10 2/10 2/10 2/10                      F/A  2/10  2/10 2/10 2/10 2/10 2/10                    thumb  2/10  2/10 2/10 2/10 2/10 2/10        Dartthrowers  2/10  2/10  2/10    2/10  2/10         TP       T 2'  T 2'  T 2'  T 2'  T 2'  T 2'  T 2'  Y 2'    TP pinch      T 2/10  T 2/10  T 2/10  T 2/10  T 2/10  T 2/10  T 2/10  Y 2/10    Remy      20 2/10  20 2/10  20 2/10  20 2/10  20 2/10  20 2/10  20 2/10 20 2/10    Blue      IV 2/10  IV 2/10 IV 2/10  IV 2/10  IV 2/10  IV 2/10  IV 2/10  V 2/10    DB E/F      1 2/10 1# 2/10  1 2/10  1 2/10  1 2/10  1 2/10  2 2/10  2 2/10    S/P      1 2/10  1# 2/10  1 2/10  1 2/10  1 2/10  1 2/10  1 2/10  1 5 2/10    Flex Bar      Y 2/I 2/1010  Y 2/10 Y 2/10  Y 2/10  Y 2/10  Y 2/10  R 2/10  R 2/10    TB hi-lo      I 2/10  I 2/10  I 2/10  II 2/10  II 2/10  II 2/10  III 2/10  III 2/10    TB Tricep      I 2/10  I 2/10  II 2/10  II 2/10  II 2/10  II 2/10  III 2/10  III 2/10   TB ER/IR        Mid Row      I 2/10  I 2/10  II 2/10  II 2/10  II 2/10  II 2/10  III 2/10  III 2/10   TB lo row      I 2/10  I 2/10  I 2/10  II 2/10  II 2/10  II 2/10  III 2/10  III 2/10   TB Bicep      I 2/10  I 2/10 II 2/10  II 2/10  II 2/10  II 2/10  III 2/10  III 2/10   TB D2                  II 2/10 II 2/10   Comp             Y 3'  Y 3'  Y 3'  Y 3'  Y 3'         Modalities  8/14 8/15 8/20 8/22  8/27  8/29  9/4  9/5  9/10  9/12   HP/biph 15 15 15 15 15 15 15 15  15  15   US 12 12 12 12 12 12 12 12  12  DC   CP 15 15 15 15 15 15 15 15  15  15

## 2019-09-17 ENCOUNTER — OFFICE VISIT (OUTPATIENT)
Dept: PHYSICAL THERAPY | Facility: CLINIC | Age: 69
End: 2019-09-17
Payer: MEDICARE

## 2019-09-17 DIAGNOSIS — S52.571A OTHER CLOSED INTRA-ARTICULAR FRACTURE OF DISTAL END OF RIGHT RADIUS, INITIAL ENCOUNTER: Primary | ICD-10-CM

## 2019-09-17 PROCEDURE — 97112 NEUROMUSCULAR REEDUCATION: CPT | Performed by: PHYSICAL THERAPIST

## 2019-09-17 PROCEDURE — 97140 MANUAL THERAPY 1/> REGIONS: CPT | Performed by: PHYSICAL THERAPIST

## 2019-09-17 PROCEDURE — 97010 HOT OR COLD PACKS THERAPY: CPT | Performed by: PHYSICAL THERAPIST

## 2019-09-17 PROCEDURE — 97110 THERAPEUTIC EXERCISES: CPT | Performed by: PHYSICAL THERAPIST

## 2019-09-17 NOTE — PROGRESS NOTES
Daily Note     Today's date: 2019  Patient name: Maria Eugenia Marinelli  : 1950  MRN: [id number]  Referring provider: Jenny Julio MD  Dx:   Encounter Diagnosis     ICD-10-CM    1  Other closed intra-articular fracture of distal end of right radius, initial encounter S52 571A                   Subjective: pt noting more motion and ability to lift, carry and use for household tasks      Objective: See treatment diary below    Pt is using her splint only for heavy activity, weaned to a wrist wrap for general activity   Pt fitted with Dynasplint for flexion  AROM right FA S/P- 50/80; Wrist E/F- 40/35                      Thumb MP- 0/40; IP- 0/30; digits- MP- 0/85; PIP- 0/95; DIP- 0/65  Strength-  II- ;  3 RODOLFO- 10/18; Key-   Circumference at wrist- 19 0 cm; MPs- 18 5 cm; thumb P1- 7 6 cm; LF P1- 6 5 cm  Sensation- intact to LT all digits  Inspection- mild swelling; using Mepiform for scar softening    Assessment: Tolerated treatment well  Patient would benefit from continued PT      Plan: Continue per plan of care        Precautions:progress strengthening within safety considerations    Manual              STM 15 15 15 15 15 15 15 15  15  15    TB            II           Mepiform            KR          Wrist wrap           MAG        Dynasplint  Flex                           Exercise Diary              TGE 23 2/3 2/3 2/3 23 23       AAROM wrist 2/10 2/10 2/10 2/10 2/10 2/10                      F/A  2/10  2/10 2/10 2/10 10 10                    thumb  2/10  2/10 10 10 10 10        Dartthrowers  2/10  2/10  /10    10  /10         TP   Y 2'    T 2'  T 2'  T 2'  T 2'  T 2'  T 2'  T 2'  Y 2'    TP pinch  Y 10    T 2/10  T 2/10  T 2/10  T 2/10  T /10  T /10  T 2/10  Y /10    Remy  20 2/10    20 2/10  20 2/10  20 2/10  20 2/10  20 2/10  20 2/10  20 2/10 20 2/10    Blue  V 2/10    IV 2/10  IV 2/10 IV 2/10  IV /10  IV /10  IV /10  IV /10  V /10    DB E/F  2 2/10    1 2/10 1# 2/10  1 2/10  1 2/10  1 2/10  1 2/10  2 2/10  2 2/10    S/P  1 5 2/10    1 2/10  1# 2/10  1 2/10  1 2/10  1 2/10  1 2/10  1 2/10  1 5 2/10    Flex Bar  R 2/10    Y 2/I 2/1010  Y 2/10 Y 2/10  Y 2/10  Y 2/10  Y 2/10  R 2/10  R 2/10    TB hi-lo  III 2/10    I 2/10  I 2/10  I 2/10  II 2/10  II 2/10  II 2/10  III 2/10  III 2/10    TB Tricep  III 2/10    I 2/10  I 2/10  II 2/10  II 2/10  II 2/10  II 2/10  III 2/10  III 2/10   TB ER/IR        Mid Row  III 2/10    I 2/10  I 2/10  II 2/10  II 2/10  II 2/10  II 2/10  III 2/10  III 2/10   TB lo row  III 2/10    I 2/10  I 2/10  I 2/10  II 2/10  II 2/10  II 2/10  III 2/10  III 2/10   TB Bicep  III 2/10    I 2/10  I 2/10 II 2/10  II 2/10  II 2/10  II 2/10  III 2/10  III 2/10   TB D2  II 2/10                II 2/10 II 2/10   Comp   Y 3'          Y 3'  Y 3'  Y 3'  Y 3'  Y 3'         Modalities  9/17            HP/biph 15 15 15 15 15 15 15 15  15  15                CP 13 15 15 15 15 15 15 15  15  15

## 2019-09-19 ENCOUNTER — OFFICE VISIT (OUTPATIENT)
Dept: PHYSICAL THERAPY | Facility: CLINIC | Age: 69
End: 2019-09-19
Payer: MEDICARE

## 2019-09-19 DIAGNOSIS — S52.571A OTHER CLOSED INTRA-ARTICULAR FRACTURE OF DISTAL END OF RIGHT RADIUS, INITIAL ENCOUNTER: Primary | ICD-10-CM

## 2019-09-19 PROCEDURE — 97140 MANUAL THERAPY 1/> REGIONS: CPT | Performed by: PHYSICAL THERAPIST

## 2019-09-19 PROCEDURE — 97110 THERAPEUTIC EXERCISES: CPT | Performed by: PHYSICAL THERAPIST

## 2019-09-19 PROCEDURE — 97112 NEUROMUSCULAR REEDUCATION: CPT | Performed by: PHYSICAL THERAPIST

## 2019-09-19 NOTE — PROGRESS NOTES
Daily Note     Today's date: 2019  Patient name: Jay Marte  : 1950  MRN: [id number]  Referring provider: Smita Bauman MD  Dx:   Encounter Diagnosis     ICD-10-CM    1  Other closed intra-articular fracture of distal end of right radius, initial encounter S52 571A                   Subjective: pt notes she is beginning to use her hand for more activity with feeling of confidence  Objective: See treatment diary below    Pt has weaned to a wrist wrap for general activity   Pt fitted with Dynasplint for flexion  AROM right FA S/P- 60/80; Wrist E/F- 50/50                      Thumb MP- 0/40; IP- 0/60; digits- MP- 0/85; PIP- 0/95; DIP- 0/65  Strength-  II- 26/60;  3 RODOLFO- ; Key-   Circumference at wrist- 19 0 cm; MPs- 18 5 cm; thumb P1- 7 6 cm; LF P1- 6 5 cm  Sensation- intact to LT all digits  Inspection- mild swelling; using Mepiform for scar softening    Assessment: Tolerated treatment well  Patient would benefit from continued PT      Plan: Continue per plan of care        Precautions:progress strengthening within safety considerations    Manual             STM 15 15 15 15 15 15 15 15  15  15    TB            II           Mepiform            KR          Wrist wrap           MAG        Dynasplint  Flex                           Exercise Diary             TGE 2/3 2/3 2/3 2/3 2/3 2/3       AAROM wrist 2/10 2/10 2/10 2/10 2/10 /10                      F/A  2/10  2/10 2/10 2/10 10 10                    thumb  2/10  2/10 10 10 10 /10        Dartthrowers  2/10  2/10  /10    /10  2/10         TP   Y 2'  Y 2'  T 2'  T 2'  T 2'  T 2'  T 2'  T 2'  T 2'  Y 2'    TP pinch  Y 10  Y 2/10  T 2/10  T 2/10  T 2/10  T 2/10  T 2/10  T 2/10  T 2/10  Y /10    Remy  20 2/10  20 2/10  20 2/10  20 2/10  20 2/10  20 2/10  20 2/10  20 /10  20 /10 20 /10    Blue  V 2/10  VI /10  IV /10  IV 2/10 IV 2/10  IV 2/10  IV 2/10  IV 2/10  IV 2/10  V 2/10    DB E/F  2 2/10  3 2/10  1 2/10 1# 2/10  1 2/10  1 2/10  1 2/10  1 2/10  2 2/10  2 2/10    S/P  1 5 2/10  1 5 2/10  1 2/10  1# 2/10  1 2/10  1 2/10  1 2/10  1 2/10  1 2/10  1 5 2/10    Flex Bar  G 2/10  G 2/10  Y 2/I 2/1010  Y 2/10 Y 2/10  Y 2/10  Y 2/10  Y 2/10  R 2/10  R 2/10    TB hi-lo  III 2/10  III 2/10  I 2/10  I 2/10  I 2/10  II 2/10  II 2/10  II 2/10  III 2/10  III 2/10    TB Tricep  III 2/10  III 2/10  I 2/10  I 2/10  II 2/10  II 2/10  II 2/10  II 2/10  III 2/10  III 2/10   TB ER/IR        Mid Row  III 2/10  III 2/10  I 2/10  I 2/10  II 2/10  II 2/10  II 2/10  II 2/10  III 2/10  III 2/10   TB lo row  III 2/10  III 2/10  I 2/10  I 2/10  I 2/10  II 2/10  II 2/10  II 2/10  III 2/10  III 2/10   TB Bicep  III 2/10  III 2/10  I 2/10  I 2/10 II 2/10  II 2/10  II 2/10  II 2/10  III 2/10  III 2/10   TB D2  II 2/10  II 2/10              II 2/10 II 2/10   Comp   Y 3' Y 3'        Y 3'  Y 3'  Y 3'  Y 3'  Y 3'         Modalities  9/17 9/19           HP/biph 15 15 15 15 15 15 15 15  15  15                CP 15 15 15 15 15 15 15 15  15  15

## 2019-10-01 ENCOUNTER — OFFICE VISIT (OUTPATIENT)
Dept: PHYSICAL THERAPY | Facility: CLINIC | Age: 69
End: 2019-10-01
Payer: MEDICARE

## 2019-10-01 DIAGNOSIS — S52.571A OTHER CLOSED INTRA-ARTICULAR FRACTURE OF DISTAL END OF RIGHT RADIUS, INITIAL ENCOUNTER: Primary | ICD-10-CM

## 2019-10-01 PROCEDURE — 97535 SELF CARE MNGMENT TRAINING: CPT | Performed by: PHYSICAL THERAPIST

## 2019-10-01 PROCEDURE — 97112 NEUROMUSCULAR REEDUCATION: CPT | Performed by: PHYSICAL THERAPIST

## 2019-10-01 PROCEDURE — 97010 HOT OR COLD PACKS THERAPY: CPT | Performed by: PHYSICAL THERAPIST

## 2019-10-01 PROCEDURE — 97110 THERAPEUTIC EXERCISES: CPT | Performed by: PHYSICAL THERAPIST

## 2019-10-01 PROCEDURE — 97140 MANUAL THERAPY 1/> REGIONS: CPT | Performed by: PHYSICAL THERAPIST

## 2019-10-01 NOTE — PROGRESS NOTES
PT Re-Evaluation     Today's date: 10/1/2019  Patient name: Veronika Coppola  : 1950  MRN: [id number]  Referring provider: Roby Neal MD  Dx:   Encounter Diagnosis     ICD-10-CM    1  Other closed intra-articular fracture of distal end of right radius, initial encounter S52 571A                   Assessment  Assessment details: Pt is a 80 YO female presenting to PT with pain, decreased AROM, strength and tolerance to activity  Pt would benefit from skilled intervention to address these issues and maximize overall function  Occupation- retired (part time )  Dominant- right; Involved- right  Pt continues to gain motion, decrease swelling and improve light functional activity  She has been progressing with strength for home activity and self care  She has been using her Dynasplint for flexion and is gaining extension with mobilization and PROM    Goals  ST  Decrease pain to 3-4 in 4 weeks            2  Decrease swelling in hand and wrist            3  Increase AROM to [facility] following protocol for DRF and radial artery repair            4  Maintain clean wound and promote healing            5   Provide orthotic for protection  LT  Increase functional motion and strength for independence with ADL and self care by DC            2  Ability to RTW and recreational activity by DC    Plan  Patient would benefit from: custom splinting and skilled physical therapy  Planned modality interventions: thermotherapy: hydrocollator packs and ultrasound  Planned therapy interventions: manual therapy, neuromuscular re-education, strengthening, stretching and home exercise program  Frequency: 2x week  Duration in weeks: 4  Treatment plan discussed with: patient        Subjective Evaluation    History of Present Illness  Date of onset: 2019  Date of surgery: 2019  Mechanism of injury: Pt fell at home fracturing her right distal radius    Surgery to stabilize and repair radial artery  Pain  Current pain ratin  At best pain ratin  At worst pain ratin  Location: right wrist    Hand dominance: right    Treatments  Current treatment: physical therapy  Patient Goals  Patient goals for therapy: decreased edema, decreased pain, increased motion, increased strength, independence with ADLs/IADLs, return to sport/leisure activities and return to work          Objective     General Comments:      Wrist/Hand Comments  PT revised splint into extension  AROM right FA S/P- 70/80; Wrist E/F- 60/60                      Thumb MP- 0/40; IP- 0/40; digits- MP- 0/85;  PIP- 0/95; DIP- 0/65  Strength-  II- 60; 3 RODOLFO- ; Key-   Circumference at wrist- 17 3 cm; MPs- 18 5 cm; thumb P1- 7 6 cm; LF P1- 6 5 cm  Sensation- intact to LT all digits  Inspection- mild swelling; using Mepiform for scar softening             Precautions:progress strengthening within safety considerations    Manual  9/17 9/19  10/1                 STM 15 15 15 15 15 15 15 15  15  15   tubigrip   D             TB            II           Mepiform            KR           Wrist wrap              MAG         Dynasplint  Flex                           Exercise Diary  9/17 9/19  10/1                 TGE 2/3 2/3 2/3 2/3 2/3 2/3           AAROM wrist 2/10 2/10 2/10 2/10 10 10                          F/A  2/10  2/10 2/10 2/10 2/10 10                        thumb  2/10  2/10 2/10 2/10 2/10 2/10            Dartthrowers  2/10  2/10  2/10    10  2/10            TP   Y 2'  Y 2'  Y 2'  T 2'  T 2'  T 2'  T 2'  T 2'  T 2'  Y 2'    TP pinch  Y 10  Y 2/10  Y 2/10  T 2/10  T 2/10  T 2/10  T 2/10  T 2/10  T 2/10  Y 2/10    Remy  20 2/10  20 2/10  20 2/10  20 2/10  20 2/10  20 2/10  20 2/10  20 2/10  20 2/10 20 2/10    Blue  V 2/10  VI 2/10  VI 2/10  IV 2/10 IV 2/10  IV 2/10  IV 2/10  IV 2/10  IV 2/10  V 2/10    DB E/F  2 2/10  3 2/10  3 2/10 1# 2/10  1 2/10  1 2/10  1 2/10  1 2/10  2 2/10  2 2/10    S/P  1 5 2/10  1 5 2/10  1 5 2/10  1# 2/10  1 2/10  1 2/10  1 2/10  1 2/10  1 2/10  1 5 2/10    Flex Bar  G 2/10  G 2/10  G 2/I0  Y 2/10 Y 2/10  Y 2/10  Y 2/10  Y 2/10  R 2/10  R 2/10    TB hi-lo  III 2/10  III 2/10  III 2/10  I 2/10  I 2/10  II 2/10  II 2/10  II 2/10  III 2/10  III 2/10    TB Tricep  III 2/10  III 2/10  III 2/10  I 2/10  II 2/10  II 2/10  II 2/10  II 2/10  III 2/10  III 2/10   TB ER/IR        Mid Row  III 2/10  III 2/10  III 2/10  I 2/10  II 2/10  II 2/10  II 2/10  II 2/10  III 2/10  III 2/10   TB lo row  III 2/10  III 2/10  III 2/10  I 2/10  I 2/10  II 2/10  II 2/10  II 2/10  III 2/10  III 2/10   TB Bicep  III 2/10  III 2/10  III 2/10  I 2/10 II 2/10  II 2/10  II 2/10  II 2/10  III 2/10  III 2/10   TB D2  II 2/10  II 2/10  II 2/10            II 2/10 II 2/10                Bodyblade    2x30"          Comp   Y 3' Y 3'  Y 3'      Y 3'  Y 3'  Y 3'  Y 3'  Y 3'         Modalities  9/17 9/19  10/1                 HP/biph 15 15 15 15 15 15 15 15  15  15                           CP 15 15 15 15 15 15 15 15  15  15

## 2019-10-03 ENCOUNTER — OFFICE VISIT (OUTPATIENT)
Dept: PHYSICAL THERAPY | Facility: CLINIC | Age: 69
End: 2019-10-03
Payer: MEDICARE

## 2019-10-03 DIAGNOSIS — S52.571A OTHER CLOSED INTRA-ARTICULAR FRACTURE OF DISTAL END OF RIGHT RADIUS, INITIAL ENCOUNTER: Primary | ICD-10-CM

## 2019-10-03 PROCEDURE — 97010 HOT OR COLD PACKS THERAPY: CPT

## 2019-10-03 PROCEDURE — 97112 NEUROMUSCULAR REEDUCATION: CPT

## 2019-10-03 PROCEDURE — 97140 MANUAL THERAPY 1/> REGIONS: CPT

## 2019-10-03 PROCEDURE — 97110 THERAPEUTIC EXERCISES: CPT

## 2019-10-03 NOTE — PROGRESS NOTES
Daily Note     Today's date: 10/3/2019  Patient name: Claudeen Elam  : 1950  MRN: [id number]  Referring provider: Darvin Montague MD  Dx:   Encounter Diagnosis     ICD-10-CM    1  Other closed intra-articular fracture of distal end of right radius, initial encounter S52 571A                   Subjective: Pt  feels she is gaining more ROM in both directions  Pt  is wearing her splint 30',  3x/day  Objective: See treatment diary below    Wrist/Hand Comments  PT revised splint into extension  AROM right FA S/P- 70/80; Wrist E/F- 60/60                      Thumb MP- 0/40; IP- 0/40; digits- MP- 0/85; PIP- 0/95; DIP- 0/65  Strength-  II- ; 3 RODOLFO- ; Key-   Circumference at wrist- 17 3 cm; MPs- 18 5 cm; thumb P1- 7 6 cm; LF P1- 6 5 cm  Sensation- intact to LT all digits  Inspection- mild swelling; using Mepiform for scar softening      Assessment: Tolerated treatment well  Patient exhibited good technique with therapeutic exercises and would benefit from continued PT  Pt  improving with ROM and strength  Plan: Progress treatment as tolerated         Precautions:progress strengthening within safety considerations    Manual  9/17 9/19  10/1  10/3               STM 15 15 15 15 15 15 15 15  15  15   tubigrip   D             TB            II           Mepiform            KR           Wrist wrap              MAG         Dynasplint  Flex                           Exercise Diary  9/17 9/19  10/1  10/3               TGE 2/3 2/3 2/3 2/3 2/3 2/3           AAROM wrist 2/10 2/10 2/10 2/10 2/10 2/10                          F/A  2/10  2/10 2/10 2/10 2/10 2/10                        thumb  2/10  2/10 2/10 2/10 2/10 2/10            Dartthrowers  2/10  2/10  2/10    2/10  2/10            TP   Y 2'  Y 2'  Y 2'  O 2'  T 2'  T 2'  T 2'  T 2'  T 2'  Y 2'    TP pinch  Y 2/10  Y 10  Y /10  O /10  T 2/10  T /10  T /10  T 2/10  T 2/10  Y 10    Remy  20 2/10  20 2/10  20 2/10  25 2/10  20 2/10  20 2/10  20 2/10  20 2/10  20 2/10 20 2/10    Blue  V 2/10  VI 2/10  VI 2/10 VII   2/10 IV 2/10  IV 2/10  IV 2/10  IV 2/10  IV 2/10  V 2/10    DB E/F  2 2/10  3 2/10  3 2/10 3#   2/10  1 2/10  1 2/10  1 2/10  1 2/10  2 2/10  2 2/10    S/P  1 5 2/10  1 5 2/10  1 5 2/10  1 5  # 2/10  1 2/10  1 2/10  1 2/10  1 2/10  1 2/10  1 5 2/10    Flex Bar  G 2/10  G 2/10  G 2/I0  G  2/10 Y 2/10  Y 2/10  Y 2/10  Y 2/10  R 2/10  R 2/10    TB hi-lo  III 2/10  III 2/10  III 2/10  III  2/10  I 2/10  II 2/10  II 2/10  II 2/10  III 2/10  III 2/10    TB Tricep  III 2/10  III 2/10  III 2/10 III  2/10  II 2/10  II 2/10  II 2/10  II 2/10  III 2/10  III 2/10   TB ER/IR        Mid Row  III 2/10  III 2/10  III 2/10 III  2/10  II 2/10  II 2/10  II 2/10  II 2/10  III 2/10  III 2/10   TB lo row  III 2/10  III 2/10  III 2/10 III   2/10  I 2/10  II 2/10  II 2/10  II 2/10  III 2/10  III 2/10   TB Bicep  III 2/10  III 2/10  III 2/10 II   2/10 II 2/10  II 2/10  II 2/10  II 2/10  III 2/10  III 2/10   TB D2  II 2/10  II 2/10  II 2/10  II  2/10          II 2/10 II 2/10                Bodyblade    2x30" 2x30"         Comp   Y 3' Y 3'  Y 3'  Y 3'    Y 3'  Y 3'  Y 3'  Y 3'  Y 3'         Modalities  9/17 9/19  10/1  10/3               HP/biph 15 15 15 15 15 15 15 15  15  15                           CP 15 15 15 15 15 15 15 15  15  15

## 2019-10-08 ENCOUNTER — OFFICE VISIT (OUTPATIENT)
Dept: PHYSICAL THERAPY | Facility: CLINIC | Age: 69
End: 2019-10-08
Payer: MEDICARE

## 2019-10-08 DIAGNOSIS — S52.571A OTHER CLOSED INTRA-ARTICULAR FRACTURE OF DISTAL END OF RIGHT RADIUS, INITIAL ENCOUNTER: Primary | ICD-10-CM

## 2019-10-08 PROCEDURE — 97112 NEUROMUSCULAR REEDUCATION: CPT

## 2019-10-08 PROCEDURE — 97140 MANUAL THERAPY 1/> REGIONS: CPT

## 2019-10-08 PROCEDURE — 97535 SELF CARE MNGMENT TRAINING: CPT

## 2019-10-08 PROCEDURE — 97010 HOT OR COLD PACKS THERAPY: CPT

## 2019-10-08 PROCEDURE — 97110 THERAPEUTIC EXERCISES: CPT

## 2019-10-08 NOTE — PROGRESS NOTES
Daily Note     Today's date: 10/8/2019  Patient name: Arminda Francois  : 1950  MRN: [id number]  Referring provider: Stacie Mansfield MD  Dx:   Encounter Diagnosis     ICD-10-CM    1  Other closed intra-articular fracture of distal end of right radius, initial encounter S52 571A                   Subjective: Pt reports some increased soreness over the weekend  "It may have been due to the increased workload last session  Objective: Wrist/Hand Comments  PT revised splint into extension  AROM right FA S/P- 70/80; Wrist E/F- 60/60                      Thumb MP- 0/40; IP- 0/40; digits- MP- 0/85; PIP- 0/95; DIP- 0/65  Strength-  II- ; 3 RODOLFO- ; Key-   Circumference at wrist- 17 3 cm; MPs- 18 5 cm; thumb P1- 7 6 cm; LF P1- 6 5 cm  Sensation- intact to LT all digits  Inspection- mild swelling; using Mepiform for scar softening   See treatment diary below      Assessment: Tolerated treatment well  Pt responding well to program   Last session initiated some soreness but nothing noted during session  Plan: Progress treatment as tolerated         Precautions:progress strengthening within safety considerations    Manual  9/17 9/19  10/1  10/3  10/8             STM 15 15 15 15 15 15 15 15  15  15   tubigrip   D    D         TB            II           Mepiform            KR           Wrist wrap              MAG         Dynasplint  Comp GloveDK  Flex        Large                     Exercise Diary  9/17 9/19  10/1  10/3  10/8             TGE 2/3 2/3 2/3 2/3 2/3 2/3           AAROM wrist 2/10 2/10 2/10 2/10 2/10 2/10                          F/A  2/10  2/10 2/10 2/10 2/10 2/10                        thumb  2/10  2/10 2/10 2/10 2/10 2/10            Dartthrowers  2/10  2/10  2/10    2/10  2/10            TP   Y 2'  Y 2'  Y 2'  O 2'  O 2'  T 2'  T 2'  T 2'  T 2'  Y 2'    TP pinch  Y /10  Y /10  Y /10  O /10  O  /10  T 10  T /10  T 2/10  T 2/10  Y 2/10    Remy  20 2/10  20 2/10  20 2/10  25 2/10  25 2/10  20 2/10  20 2/10  20 2/10  20 2/10 20 2/10    Blue  V 2/10  VI 2/10  VI 2/10 VII   2/10 VII 2/10  IV 2/10  IV 2/10  IV 2/10  IV 2/10  V 2/10    DB E/F  2 2/10  3 2/10  3 2/10 3#   2/10 3# 2/10  1 2/10  1 2/10  1 2/10  2 2/10  2 2/10    S/P  1 5 2/10  1 5 2/10  1 5 2/10  1 5  # 2/10  1 5 2/10  1 2/10  1 2/10  1 2/10  1 2/10  1 5 2/10    Flex Bar  G 2/10  G 2/10  G 2/I0  G  2/10 g 2/10  Y 2/10  Y 2/10  Y 2/10  R 2/10  R 2/10    TB hi-lo  III 2/10  III 2/10  III 2/10  III  2/10  III 2/10  II 2/10  II 2/10  II 2/10  III 2/10  III 2/10    TB Tricep  III 2/10  III 2/10  III 2/10 III  2/10  III 2/10  II 2/10  II 2/10  II 2/10  III 2/10  III 2/10   TB ER/IR        Mid Row  III 2/10  III 2/10  III 2/10 III  2/10  III 2/10  II 2/10  II 2/10  II 2/10  III 2/10  III 2/10   TB lo row  III 2/10  III 2/10  III 2/10 III   2/10  III 2/10  II 2/10  II 2/10  II 2/10  III 2/10  III 2/10   TB Bicep  III 2/10  III 2/10  III 2/10 II   2/10 II 2/10  II 2/10  II 2/10  II 2/10  III 2/10  III 2/10   TB D2  II 2/10  II 2/10  II 2/10  II  2/10  II 2/10        II 2/10 II 2/10                Bodyblade    2x30" 2x30" 2x30"        Comp   Y 3' Y 3'  Y 3'  Y 3'  Y 3'  Y 3'  Y 3'  Y 3'  Y 3'  Y 3'         Modalities  9/17 9/19  10/1  10/3  10/8             HP/biph 15 15 15 15 15 15 15 15  15  15                           CP 15 15 15 15 15 15 15 15  15  15

## 2019-10-10 ENCOUNTER — OFFICE VISIT (OUTPATIENT)
Dept: PHYSICAL THERAPY | Facility: CLINIC | Age: 69
End: 2019-10-10
Payer: MEDICARE

## 2019-10-10 DIAGNOSIS — S52.571A OTHER CLOSED INTRA-ARTICULAR FRACTURE OF DISTAL END OF RIGHT RADIUS, INITIAL ENCOUNTER: Primary | ICD-10-CM

## 2019-10-10 PROCEDURE — 97010 HOT OR COLD PACKS THERAPY: CPT | Performed by: PHYSICAL THERAPIST

## 2019-10-10 PROCEDURE — 97035 APP MDLTY 1+ULTRASOUND EA 15: CPT | Performed by: PHYSICAL THERAPIST

## 2019-10-10 PROCEDURE — 97112 NEUROMUSCULAR REEDUCATION: CPT | Performed by: PHYSICAL THERAPIST

## 2019-10-10 PROCEDURE — 97140 MANUAL THERAPY 1/> REGIONS: CPT | Performed by: PHYSICAL THERAPIST

## 2019-10-10 PROCEDURE — 97110 THERAPEUTIC EXERCISES: CPT | Performed by: PHYSICAL THERAPIST

## 2019-10-10 NOTE — PROGRESS NOTES
Daily Note     Today's date: 10/10/2019  Patient name: Siva Olivera  : 1950  MRN: [id number]  Referring provider: Royal Meraz MD  Dx:   Encounter Diagnosis     ICD-10-CM    1  Other closed intra-articular fracture of distal end of right radius, initial encounter [id number]                   Subjective: pt notes overall progress but soreness and weakness persist         Objective: See treatment diary below    AROM right FA S/P- 70/80; Wrist E/F- 60/60                      Thumb MP- 0/40; IP- 0/40; digits- MP- 0/85; PIP- 0/95; DIP- 0/65  Strength-  II- 32/60; 3 RODOLFO- 14/20; Key- 18  Circumference at wrist- 17 3 cm; MPs- 18 5 cm; thumb P1- 7 6 cm; LF P1- 6 5 cm  Sensation- intact to LT all digits  Inspection- mild swelling; using Mepiform for scar softening    Assessment: Tolerated treatment well  Patient would benefit from continued PT      Plan: Continue per plan of care        Precautions:progress strengthening within safety considerations    Manual  9/17 9/19  10/1  10/3  10/8  10/10           STM 15 15 15 15 15 15 15 15  15  15   isotoner   D     med        TB                       Mepiform                       Wrist wrap              MAG         Dynasplint  Comp GloveDK  Flex        Large                     Exercise Diary  9/17 9/19  10/1  10/3  10/8  10/10           TGE 2/3 2/3 2/3 2/3 2/3 2/3           AAROM wrist 2/10 2/10 2/10 2/10 2/10 2/10                          F/A  2/10  2/10 2/10 2/10 2/10 2/10                        thumb  2/10  2/10 2/10 2/10 2/10 2/10            Dartthrowers  2/10  2/10  2/10    2/10  2/10            TP   Y 2'  Y 2'  Y 2'  O 2'  O 2'  O 2'  T 2'  T 2'  T 2'  Y 2'    TP pinch  Y 10  Y /10  Y /10  O /10  O  /10  O 2/10  T /10  T /10  T 2/10  Y 10    Remy  20 2/10  20 2/10  20 2/10  25 2/10  25 210  25 2/10  20 2/10  20 2/10  20 2/10 20 2/10    Blue  V 2/10  VI 2/10  VI 2/10 VII   2/10 VII 2/10  VII 2/10  IV 2/10  IV 2/10  IV 2/10  V 2/10    DB E/F  2 2/10  3 2/10  3 2/10 3#   2/10 3# 2/10  3 2/10  1 2/10  1 2/10  2 2/10  2 2/10    S/P  1 5 2/10  1 5 2/10  1 5 2/10  1 5  # 2/10  1 5 2/10  1 5 2/10  1 2/10  1 2/10  1 2/10  1 5 2/10    Flex Bar  G 2/10  G 2/10  G 2/I0  G  2/10 G 2/10  G 2/10  Y 2/10  Y 2/10  R 2/10  R 2/10    TB hi-lo  III 2/10  III 2/10  III 2/10  III  2/10  III 2/10  III 2/10  II 2/10  II 2/10  III 2/10  III 2/10    TB Tricep  III 2/10  III 2/10  III 2/10 III  2/10  III 2/10  III 2/10  II 2/10  II 2/10  III 2/10  III 2/10   TB ER/IR        Mid Row  III 2/10  III 2/10  III 2/10 III  2/10  III 2/10  III 2/10  II 2/10  II 2/10  III 2/10  III 2/10   TB lo row  III 2/10  III 2/10  III 2/10 III   2/10  III 2/10  III 2/10  II 2/10  II 2/10  III 2/10  III 2/10   TB Bicep  III 2/10  III 2/10  III 2/10 III   2/10 III 2/10  III 2/10  II 2/10  II 2/10  III 2/10  III 2/10   TB D2  II 2/10  II 2/10  II 2/10  II  2/10  II 2/10 II 2/10      II 2/10 II 2/10                Co-contr    2x30" 2x30" 2x30" B 1#       Comp   Y 3' Y 3'  Y 3'  Y 3'  Y 3'  Y 3'  Y 3'  Y 3'  Y 3'  Y 3'         Modalities  9/17 9/19  10/1  10/3  10/8  10/10           HP/biph 15 15 15 15 15 15 15 15  15  15                           CP 15 15 15 15 15 15 15 15  15  15

## 2019-10-15 ENCOUNTER — OFFICE VISIT (OUTPATIENT)
Dept: PHYSICAL THERAPY | Facility: CLINIC | Age: 69
End: 2019-10-15
Payer: MEDICARE

## 2019-10-15 DIAGNOSIS — S52.571A OTHER CLOSED INTRA-ARTICULAR FRACTURE OF DISTAL END OF RIGHT RADIUS, INITIAL ENCOUNTER: Primary | ICD-10-CM

## 2019-10-15 PROCEDURE — 97140 MANUAL THERAPY 1/> REGIONS: CPT | Performed by: PHYSICAL THERAPIST

## 2019-10-15 PROCEDURE — 97010 HOT OR COLD PACKS THERAPY: CPT | Performed by: PHYSICAL THERAPIST

## 2019-10-15 PROCEDURE — 97112 NEUROMUSCULAR REEDUCATION: CPT | Performed by: PHYSICAL THERAPIST

## 2019-10-15 PROCEDURE — 97035 APP MDLTY 1+ULTRASOUND EA 15: CPT | Performed by: PHYSICAL THERAPIST

## 2019-10-15 PROCEDURE — 97110 THERAPEUTIC EXERCISES: CPT | Performed by: PHYSICAL THERAPIST

## 2019-10-15 NOTE — PROGRESS NOTES
PT Re-Evaluation     Today's date: 10/15/2019  Patient name: Annie Pearce  : 1950  MRN: [id number]  Referring provider: Tr Barraza MD  Dx:   Encounter Diagnosis     ICD-10-CM    1  Other closed intra-articular fracture of distal end of right radius, initial encounter S52 571A                   Assessment  Assessment details: Pt is a 78 YO female presenting to PT with pain, decreased AROM, strength and tolerance to activity  Pt would benefit from skilled intervention to address these issues and maximize overall function  Occupation- retired (part time )  Dominant- right; Involved- right  Pt continues to gain motion, decrease swelling and improve light functional activity  She has been progressing with strength for home activity and self care  She has been using her Dynasplint for flexion and is gaining extension with mobilization and PROM    Goals  ST  Decrease pain to 3-4 in 4 weeks            2  Decrease swelling in hand and wrist            3  Increase AROM to [facility] following protocol for DRF and radial artery repair            4  Maintain clean wound and promote healing            5   Provide orthotic for protection  LT  Increase functional motion and strength for independence with ADL and self care by DC            2  Ability to RTW and recreational activity by DC    Plan  Patient would benefit from: custom splinting and skilled physical therapy  Planned modality interventions: thermotherapy: hydrocollator packs, cryotherapy and ultrasound  Planned therapy interventions: manual therapy, neuromuscular re-education, therapeutic exercise, stretching, strengthening and home exercise program  Frequency: 2x week  Duration in weeks: 4  Treatment plan discussed with: patient        Subjective Evaluation    History of Present Illness  Date of onset: 2019  Date of surgery: 2019  Mechanism of injury: Pt fell at home fracturing her right distal radius    Surgery to stabilize and repair radial artery  Pain  Current pain ratin  At best pain ratin  At worst pain ratin  Location: right wrist    Hand dominance: right    Treatments  Current treatment: physical therapy  Patient Goals  Patient goals for therapy: decreased edema, decreased pain, increased motion, increased strength, independence with ADLs/IADLs, return to sport/leisure activities and return to work          Objective     General Comments:      Wrist/Hand Comments  PT revised splint into extension  AROM right FA S/P- 70/80; Wrist E/F- 65/60                      Thumb MP- 0/40; IP- 0/40; digits- MP- 0/85;  PIP- 0/95; DIP- 0/65  Strength-  II- 38/64; 3 RODOLFO- 15/19; Sage-   Circumference at wrist- 17 3 cm; MPs- 18 5 cm; thumb P1- 7 6 cm; LF P1- 6 5 cm  Sensation- intact to LT all digits  Inspection- mild swelling; using Mepiform for scar softening             Precautions:progress strengthening within safety considerations    Manual  9/17 9/19  10/1  10/3  10/8 10/10  10/15          STM 15 15 15 15 15 15 15 15  15  15   tubigrip   D                      TB            II           Mepiform            KR           Wrist wrap                       Dynasplint  Flex                           Exercise Diary  9/17 9/19  10/1  10/3  10/8 10/10  10/15          TGE 2/3 2/3 2/3 2/3 2/3 2/3  2/3         AAROM wrist 2/10 2/10 2/10 2/10 2/10 2/10  2/10                        F/A  2/10  2/10 2/10 2/10 2/10 2/10  2/10                      thumb  2/10  2/10 2/10 2/10 2/10 2/10  2/10          Dartthrowers  2/10  2/10  2/10    2/10  2/10  2/10          TP   Y 2'  Y 2'  Y 2'  O 2'  O 2'  O 2''  O 2'  T 2'  T 2'  Y 2'    TP pinch  Y 2/10  Y 10  Y 10  O /10  O /10  O 2/10  TO2/10  T 2/10  T 10  Y 2/10    Remy  20 2/10  20 2/10  20 2/10  25 2/10  20 2/10  20 2/10  20 2/10  20 2/10  20 2/10 20 2/10    Blue  V 2/10  VI 2/10  VI 2/10 VII   2/10 Vii 2/10  Vii 2/10  Vii /10  IV 2/10  IV /10  V 2/10    DB E/F  2 210  3 2/10  3 2/10 3#   2/10  32/10  3 2/10  3 2/10  1 2/10  2 2/10  2 2/10    S/P  1 5 2/10  1 5 2/10  1 5 2/10  1 5  # 2/10  1 5 2/10  1 5 2/10  1 5 2/10  1 2/10  1 2/10  1 5 2/10    Flex Bar  G 2/10  G 2/10  G 2/I0  G  2/10 G 2/10  G 2/10  G 2/10  Y 2/10  R 2/10  R 2/10    TB hi-lo  III 2/10  III 2/10  III 2/10  III  2/10  III 2/10  III 2/10  III 2/10  II 2/10  III 2/10  III 2/10    TB Tricep  III 2/10  III 2/10  III 2/10 III  2/10  III 2/10  III 2/10  III 2/10  II 2/10  III 2/10  III 2/10   TB ER/IR        Mid Row  III 2/10  III 2/10  III 2/10 III  2/10  III 2/10  III 2/10  III 2/10  II 2/10  III 2/10  III 2/10   TB lo row  III 2/10  III 2/10  III 2/10 III   2/10  III 2/10  III 2/10  III 2/10  II 2/10  III 2/10  III 2/10   TB Bicep  III 2/10  III 2/10  III 2/10 III   2/10 III 2/10  III 2/10  III 2/10  II 2/10  III 2/10  III 2/10   TB D2  II 2/10  II 2/10  II 2/10  II  2/10  II 2/10  II 2/10  II 2/10    II 2/10 II 2/10                           Co-Contr       B 1# 1'      Bodyblade      2x30" 2x30"      2x30         Comp   Y 3' Y 3'  Y 3'  Y 3'    Y 3'  Y 3'  Y 3'  Y 3'  Y 3'         Modalities  9/17 9/19  10/1  10/3  10/8  10/10  10/15         HP/biph 15 15 15 15 15 15 15 15  15  15                           CP 15 15 15 15 15 15 15 15  15  15

## 2019-10-17 ENCOUNTER — OFFICE VISIT (OUTPATIENT)
Dept: PHYSICAL THERAPY | Facility: CLINIC | Age: 69
End: 2019-10-17
Payer: MEDICARE

## 2019-10-17 DIAGNOSIS — S52.571A OTHER CLOSED INTRA-ARTICULAR FRACTURE OF DISTAL END OF RIGHT RADIUS, INITIAL ENCOUNTER: Primary | ICD-10-CM

## 2019-10-17 PROCEDURE — 97110 THERAPEUTIC EXERCISES: CPT

## 2019-10-17 PROCEDURE — 97140 MANUAL THERAPY 1/> REGIONS: CPT

## 2019-10-17 PROCEDURE — 97010 HOT OR COLD PACKS THERAPY: CPT

## 2019-10-17 PROCEDURE — 97112 NEUROMUSCULAR REEDUCATION: CPT

## 2019-10-17 NOTE — PROGRESS NOTES
CHIEF COMPLAINT:  Chief Complaint   Patient presents with    Right Wrist - Follow-up       SUBJECTIVE:  Lemuel Granado is a 79y o  year old RHD female who presents  for follow-up regarding right distal radius ORIF and right radial artery repair performed on 2019  Pt has been attending therapy as advised and working on HEP  Pt has been consistent with her Carolyn splint use  Today patient states that she is doing well  No complaints of pain  PAST MEDICAL HISTORY:  Past Medical History:   Diagnosis Date    Arthritis     generalized    Fractures     High cholesterol     Irritable bowel syndrome     occasional bouts of it but not bad       PAST SURGICAL HISTORY:  Past Surgical History:   Procedure Laterality Date    CERVICAL BIOPSY  W/ LOOP ELECTRODE EXCISION  ?     COLONOSCOPY      WI OPEN TX RADIAL & ULNAR SHAFT FX FIX RADIUS OR ULNA Right 2019    Procedure: OPEN REDUCTION W/ INTERNAL FIXATION (ORIF) RADIUS / ULNA (WRIST) right;  Surgeon: Iraida Winters MD;  Location: [address] [place] [address];  Service: Orthopedics    WISDOM TOOTH EXTRACTION         FAMILY HISTORY:  Family History   Problem Relation Age of Onset    No Known Problems Mother     Heart disease Father     No Known Problems Sister     No Known Problems Sister     No Known Problems Sister        SOCIAL HISTORY:  Social History     Tobacco Use    Smoking status: Former Smoker     Last attempt to quit: 1972     Years since quittin 8    Smokeless tobacco: Never Used   Substance Use Topics    Alcohol use: Yes     Frequency: 4 or more times a week     Drinks per session: 1 or 2     Binge frequency: Never     Comment: occasionally    Drug use: Never       MEDICATIONS:    Current Outpatient Medications:     ALPRAZolam (XANAX) 0 25 mg tablet, TAKE 1 TO 2 TABLETS BEFORE TAKE-OFF, Disp: , Rfl: 0    ascorbic acid (VITAMIN C) 500 mg tablet, Take 500 mg by mouth every morning , Disp: , Rfl:     atorvastatin (LIPITOR) 10 mg tablet, Take 10 mg by mouth 3 (three) times a week , Disp: , Rfl:     calcium carbonate (OS-MICHAEL) 600 MG tablet, Take 600 mg by mouth daily, Disp: , Rfl:     fluticasone (FLONASE) 50 mcg/act nasal spray, 1 spray into each nostril every morning , Disp: , Rfl:     montelukast (SINGULAIR) 10 mg tablet, Take 10 mg by mouth daily at bedtime , Disp: , Rfl:     Multiple Vitamins-Minerals (CENTRUM PO), Take 1 caplet by mouth every morning, Disp: , Rfl:     Omega-3 Fatty Acids (FISH OIL) 1200 MG CAPS, Take 1,200 mg by mouth every morning , Disp: , Rfl:     aspirin (ECOTRIN) 325 mg EC tablet, Take 1 tablet (325 mg total) by mouth daily for 14 doses, Disp: 14 tablet, Rfl: 0    ALLERGIES:  Allergies   Allergen Reactions    Mobic [Meloxicam] Rash    Tetracycline Rash       REVIEW OF SYSTEMS:  Review of Systems   Constitutional: Negative for chills, fever and unexpected weight change  HENT: Negative for hearing loss, nosebleeds and sore throat  Eyes: Negative for pain, redness and visual disturbance  Respiratory: Negative for cough, shortness of breath and wheezing  Cardiovascular: Negative for chest pain, palpitations and leg swelling  Gastrointestinal: Negative for abdominal pain, nausea and vomiting  Endocrine: Negative for polydipsia and polyuria  Genitourinary: Negative for dysuria and hematuria  Skin: Negative for rash and wound  Neurological: Negative for dizziness and headaches  Psychiatric/Behavioral: Negative for decreased concentration, dysphoric mood and suicidal ideas  The patient is not nervous/anxious          VITALS:  Vitals:    10/18/19 0943   BP: 156/76   Pulse: 67       LABS:  HgA1c: No results found for: HGBA1C  BMP: No results found for: GLUCOSE, CALCIUM, NA, K, CO2, CL, BUN, CREATININE    _____________________________________________________  PHYSICAL EXAMINATION:  General: well developed and well nourished, alert, oriented times 3 and appears comfortable  Psychiatric: Normal  HEENT: Trachea Midline, No torticollis  Pulmonary: No audible wheezing or respiratory distress   Skin: No masses, erythema, lacerations, fluctation, ulcerations  Neurovascular: Sensation Intact to the Median, Ulnar, Radial Nerve, Motor Intact to the Median, Ulnar, Radial Nerve and Pulses Intact    MUSCULOSKELETAL EXAMINATION:  Right wrist  Well healed incision   Jhonatan's test demonstrated no blood flow in radial artery, ulna artery is patent and providing sufficient blood flow to the hand  All fingers are pink and profuse  No palpable radial pulse  Almost symmetrical extension of the wrist when compared to contralateral side  Limited extension when compared to contralateral side       ___________________________________________________  STUDIES REVIEWED:  No studies reviewed  PROCEDURES PERFORMED:  Procedures  No Procedures performed today    _____________________________________________________  ASSESSMENT/PLAN:  S/P right distal radius ORIF and right radial artery repair  * pt was advised that the radial nerve repair must have clotted off and is not providing blood flow to the hand  * Pt also understands that the ulna nerve is providing sufficient blood flow to the hand  * Pt was advised to call the office if she begins to have pain or white discoloration in her fingertips   * Pt was advised to continure to work on motion of the wrist with a HEP provided by therapy  * Message was sent to OT to DC therapy and Dynasplint       Follow Up:  Return if symptoms worsen or fail to improve          To Do Next Visit:  Re-evaluation of current issue        Scribe Attestation    I,:   Lilly Paniagua am acting as a scribe while in the presence of the attending physician :        I,:   Jesse Pacheco MD personally performed the services described in this documentation    as scribed in my presence :

## 2019-10-17 NOTE — PROGRESS NOTES
Daily Note     Today's date: 10/17/2019  Patient name: Lio Wiggins  : 1950  MRN: [id number]  Referring provider: Indu Bolivar MD  Dx:   Encounter Diagnosis     ICD-10-CM    1  Other closed intra-articular fracture of distal end of right radius, initial encounter S52 571A                   Subjective: Pt  reports she feels she is doing better with her strength as she is faithful with her HEP and use of dynasplint  Pt  to see Dr Tico Casas  Objective: See treatment diary below    AROM right FA S/P- 70/80; Wrist E/F- 60/60                      Thumb MP- 0/40; IP- 0/40; digits- MP- 0/85; PIP- 0/95; DIP- 0/65  Strength-  II- 32/60; 3 RODOLFO- 14; Key- 18  Circumference at wrist- 17 3 cm; MPs- 18 5 cm; thumb P1- 7 6 cm; LF P1- 6 5 cm  Sensation- intact to LT all digits  Inspection- mild swelling; using Mepiform for scar softening        Assessment: Tolerated treatment well  Patient exhibited good technique with therapeutic exercises and would benefit from continued PT  Tightness persists supination and flexion, although strength is increasing  Plan: Progress treatment as tolerated         Precautions:progress strengthening within safety considerations    Manual  9/17 9/19  10/1  10/3  10/8 10/10  10/15   10/17       STM 15 15 15 15 15 15 15 15  15  15   tubigrip   D                      TB            II           Mepiform            KR           Wrist wrap                       Dynasplint  Flex                           Exercise Diary  9/17 9/19  10/1  10/3  10/8 10/10  10/15   10/17       TGE 2/3 2/3 2/3 2/3 2/3 2/3  2/3  2/3       AAROM wrist 2/10 2/10 2/10 2/10 2/10 2/10  2/10  2/10                      F/A  2/10  2/10 2/10 2/10 2/10 2/10  2/10  2/10                    thumb  2/10  2/10 2/10 2/10 2/10 2/10  2/10  2/10        Dartthrowers  2/10  2/10  2/10    2/10  2/10  2/10  2/10        TP   Y 2'  Y 2'  Y 2'  O 2'  O 2'  O 2''  O 2'  O 2'  T 2'  Y 2'    TP pinch  Y 2/10  Y 2/10  Y 2/10  O 2/10  O 2/10  O 2/10  TO2/10  O 2/10  T 2/10  Y 2/10    Remy  20 2/10  20 2/10  20 2/10  25 2/10  20 2/10  20 2/10  20 2/10  25 2/10  20 2/10 21 2/10    Blue  V 2/10  VI 2/10  VI 2/10 VII   2/10 Vii 2/10  Vii 2/10  Vii 2/10 VIII  2/10  IV 2/10  V 2/10    DB E/F  2 2/10  3 2/10  3 2/10 3#   2/10  32/10  3 2/10  3 2/10  3 2/10  3 2/10  2 2/10    S/P  1 5 2/10  1 5 2/10  1 5 2/10  1 5  # 2/10  1 5 2/10  1 5 2/10  1 5 2/10  1 5 2/10  1 2/10  1 5 2/10    Flex Bar  G 2/10  G 2/10  G 2/I0  G  2/10 G 2/10  G 2/10  G 2/10  G 2/10  R 2/10  R 2/10    TB hi-lo  III 2/10  III 2/10  III 2/10  III  2/10  III 2/10  III 2/10  III 2/10  III 2/10  III 2/10  III 2/10    TB Tricep  III 2/10  III 2/10  III 2/10 III  2/10  III 2/10  III 2/10  III 2/10  III 2/10  III 2/10  III 2/10   TB ER/IR        Mid Row  III 2/10  III 2/10  III 2/10 III  2/10  III 2/10  III 2/10  III 2/10  III 2/10  III 2/10  III 2/10   TB lo row  III 2/10  III 2/10  III 2/10 III   2/10  III 2/10  III 2/10  III 2/10  III 2/10  III 2/10  III 2/10   TB Bicep  III 2/10  III 2/10  III 2/10 III   2/10 III 2/10  III 2/10  III 2/10  III 2/10  III 2/10  III 2/10   TB D2  II 2/10  II 2/10  II 2/10  II  2/10  II 2/10  II 2/10  II 2/10  II 2/10  II 2/10 II 2/10                           Co-Contr       B 1# 1' B 1#  1'     Bodyblade      2x30" 2x30"      2x30         Comp   Y 3' Y 3'  Y 3'  Y 3'    Y 3'  Y 3' NP'  Y 3'  Y 3'         Modalities  9/17 9/19  10/1  10/3  10/8  10/10  10/15  10/17       HP/biph 15 15 15 15 15 15 15 15  15  15                           CP 15 15 15 15 15 15 15 15  15  15

## 2019-10-18 ENCOUNTER — OFFICE VISIT (OUTPATIENT)
Dept: OBGYN CLINIC | Facility: CLINIC | Age: 69
End: 2019-10-18
Payer: MEDICARE

## 2019-10-18 VITALS
HEIGHT: 70 IN | BODY MASS INDEX: 32.07 KG/M2 | WEIGHT: 224 LBS | SYSTOLIC BLOOD PRESSURE: 156 MMHG | HEART RATE: 67 BPM | DIASTOLIC BLOOD PRESSURE: 76 MMHG

## 2019-10-18 DIAGNOSIS — Z48.89 AFTERCARE FOLLOWING SURGERY: Primary | ICD-10-CM

## 2019-10-18 PROCEDURE — 99213 OFFICE O/P EST LOW 20 MIN: CPT | Performed by: ORTHOPAEDIC SURGERY

## 2019-10-18 RX ORDER — ALPRAZOLAM 0.25 MG/1
TABLET ORAL
Refills: 0 | COMMUNITY
Start: 2019-09-06

## 2019-10-22 ENCOUNTER — OFFICE VISIT (OUTPATIENT)
Dept: PHYSICAL THERAPY | Facility: CLINIC | Age: 69
End: 2019-10-22
Payer: MEDICARE

## 2019-10-22 DIAGNOSIS — S52.571A OTHER CLOSED INTRA-ARTICULAR FRACTURE OF DISTAL END OF RIGHT RADIUS, INITIAL ENCOUNTER: Primary | ICD-10-CM

## 2019-10-22 PROCEDURE — 97140 MANUAL THERAPY 1/> REGIONS: CPT | Performed by: PHYSICAL THERAPIST

## 2019-10-22 PROCEDURE — 97112 NEUROMUSCULAR REEDUCATION: CPT | Performed by: PHYSICAL THERAPIST

## 2019-10-22 PROCEDURE — 97110 THERAPEUTIC EXERCISES: CPT | Performed by: PHYSICAL THERAPIST

## 2019-10-22 PROCEDURE — 97535 SELF CARE MNGMENT TRAINING: CPT | Performed by: PHYSICAL THERAPIST

## 2019-10-22 PROCEDURE — 97010 HOT OR COLD PACKS THERAPY: CPT | Performed by: PHYSICAL THERAPIST

## 2019-10-22 NOTE — PROGRESS NOTES
PT Discharge    Today's date: 10/22/2019  Patient name: Myles Corrigan  : 1950  MRN: [id number]  Referring provider: Elaine Miranda MD  Dx:   Encounter Diagnosis     ICD-10-CM    1  Other closed intra-articular fracture of distal end of right radius, initial encounter S52 571A                   Assessment  Assessment details: Pt is a 78 YO female presenting to PT with pain, decreased AROM, strength and tolerance to activity  Pt would benefit from skilled intervention to address these issues and maximize overall function  Occupation- retired (part time )  Dominant- right; Involved- right  Pt continues to gain motion, decrease swelling and improve light functional activity  She has been progressing with strength for home activity and self care  She has been using her Dynasplint for flexion and is gaining extension with mobilization and PROM  Pt was seen by her physician with recommendation to continue as a HEP and DC her Dynasplint  Pt was instructed in a HEP  Goals  ST  Decrease pain to 3-4 in 4 weeks            2  Decrease swelling in hand and wrist            3  Increase AROM to [facility] following protocol for DRF and radial artery repair            4  Maintain clean wound and promote healing            5   Provide orthotic for protection  LT  Increase functional motion and strength for independence with ADL and self care by DC            2   Ability to RTW and recreational activity by DC  Goals met    Plan  Patient would benefit from: custom splinting and skilled physical therapy  Planned modality interventions: thermotherapy: hydrocollator packs, cryotherapy and ultrasound  Planned therapy interventions: manual therapy, neuromuscular re-education, home exercise program, therapeutic exercise, stretching and strengthening  Frequency: 2x week  Duration in weeks: 4  Treatment plan discussed with: patient        Subjective Evaluation    History of Present Illness  Date of onset: 2019  Date of surgery: 2019  Mechanism of injury: Pt fell at home fracturing her right distal radius  Surgery to stabilize and repair radial artery  Pain  Current pain ratin  At best pain ratin  At worst pain ratin  Location: right wrist    Hand dominance: right    Treatments  Current treatment: physical therapy  Patient Goals  Patient goals for therapy: decreased edema, decreased pain, increased motion, increased strength, independence with ADLs/IADLs, return to sport/leisure activities and return to work          Objective     General Comments:      Wrist/Hand Comments  PT revised splint into extension  AROM right FA S/P- 70/80; Wrist E/F- 65/60                      Thumb MP- 0/40; IP- 0/40; digits- MP- 0/85;  PIP- 0/95; DIP- 0/65  Strength-  II- 38/64; 3 RODOLFO- 15/19; Sage-   Circumference at wrist- 17 3 cm; MPs- 18 5 cm; thumb P1- 7 6 cm; LF P1- 6 5 cm  Sensation- intact to LT all digits  Inspection- mild swelling; using Mepiform for scar softening             Precautions:progress strengthening within safety considerations    Manual  9/17 9/19  10/1  10/3  10/8 10/10  10/15   10/17  10/22     STM 15 15 15 15 15 15 15 15  15  15   tubigrip   D                      TB            II           Mepiform            KR           Wrist wrap                       Dynasplint  Flex                           Exercise Diary  9/17 9/19  10/1  10/3  10/8 10/10  10/15   10/17  10/22     TGE 2/3 2/3 2/3 2/3 2/3 2/3  2/3  2/3  2/3     AAROM wrist 2/10 2/10 2/10 2/10 2/10 2/10  2/10  2/10  2/10                    F/A  2/10  2/10 2/10 2/10 2/10 2/10  2/10  2/10  2/10                  thumb  2/10  2/10 2/10 2/10 2/10 2/10  2/10  2/10  2/10      Dartthrowers  2/10  2/10  2/10    2/10  2/10  2/10  2/10  2/10      TP   Y 2'  Y 2'  Y 2'  O 2'  O 2'  O 2''  O 2'  O 2'  O 2'  Y 2'    TP pinch  Y 2/10  Y 210  Y 210  O 2/10  O 2/10  O /10  TO/10  O /10  O 10  Y 2/10    Remy  20 2/10  20 2/10  20 2/10  25 2/10  20 2/10  20 2/10  20 2/10  25 2/10  25 2/10 21 2/10    Blue  V 2/10  VI 2/10  VI 2/10 VII   2/10 Vii 2/10  Vii 2/10  Vii 2/10 VIII  2/10  VII 2/10  V 2/10    DB E/F  2 2/10  3 2/10  3 2/10 3#   2/10  32/10  3 2/10  3 2/10  3 2/10  3 2/10  2 2/10    S/P  1 5 2/10  1 5 2/10  1 5 2/10  1 5  # 2/10  1 5 2/10  1 5 2/10  1 5 2/10  1 5 2/10  1 5 2/10  1 5 2/10    Flex Bar  G 2/10  G 2/10  G 2/I0  G  2/10 G 2/10  G 2/10  G 2/10  G 2/10  G2/10  R 2/10    TB hi-lo  III 2/10  III 2/10  III 2/10  III  2/10  III 2/10  III 2/10  III 2/10  III 2/10  III 2/10  III 2/10    TB Tricep  III 2/10  III 2/10  III 2/10 III  2/10  III 2/10  III 2/10  III 2/10  III 2/10  III 2/10  III 2/10   TB ER/IR        Mid Row  III 2/10  III 2/10  III 2/10 III  2/10  III 2/10  III 2/10  III 2/10  III 2/10  III 2/10  III 2/10   TB lo row  III 2/10  III 2/10  III 2/10 III   2/10  III 2/10  III 2/10  III 2/10  III 2/10  III 2/10  III 2/10   TB Bicep  III 2/10  III 2/10  III 2/10 III   2/10 III 2/10  III 2/10  III 2/10  III 2/10  III 2/10  III 2/10   TB D2  II 2/10  II 2/10  II 2/10  II  2/10  II 2/10  II 2/10  II 2/10  II 2/10  II 2/10 II 2/10                           Co-Contr             B 1# 1' B 1#  1'  B 1# 1'     Bodyblade      2x30" 2x30"      2x30         Comp   Y 3' Y 3'  Y 3'  Y 3'    Y 3'  Y 3' NP'  Y 3'  Y 3'         Modalities  9/17 9/19  10/1  10/3  10/8  10/10  10/15  10/17  10/22     HP/biph 15 15 15 15 15 15 15 15  15  15                           CP 15 15 15 15 15 15 15 15  15  15

## 2021-03-10 DIAGNOSIS — Z23 ENCOUNTER FOR IMMUNIZATION: ICD-10-CM

## 2021-11-22 ENCOUNTER — OFFICE VISIT (OUTPATIENT)
Dept: URGENT CARE | Facility: CLINIC | Age: 71
End: 2021-11-22
Payer: MEDICARE

## 2021-11-22 ENCOUNTER — APPOINTMENT (OUTPATIENT)
Dept: RADIOLOGY | Facility: CLINIC | Age: 71
End: 2021-11-22
Payer: MEDICARE

## 2021-11-22 VITALS
RESPIRATION RATE: 18 BRPM | HEART RATE: 65 BPM | DIASTOLIC BLOOD PRESSURE: 69 MMHG | BODY MASS INDEX: 30.8 KG/M2 | WEIGHT: 220 LBS | OXYGEN SATURATION: 98 % | TEMPERATURE: 97.9 F | HEIGHT: 71 IN | SYSTOLIC BLOOD PRESSURE: 160 MMHG

## 2021-11-22 DIAGNOSIS — S93.402A SPRAIN OF LEFT ANKLE, UNSPECIFIED LIGAMENT, INITIAL ENCOUNTER: Primary | ICD-10-CM

## 2021-11-22 DIAGNOSIS — M25.572 ACUTE LEFT ANKLE PAIN: ICD-10-CM

## 2021-11-22 PROCEDURE — 99213 OFFICE O/P EST LOW 20 MIN: CPT | Performed by: PHYSICIAN ASSISTANT

## 2021-11-22 PROCEDURE — G0463 HOSPITAL OUTPT CLINIC VISIT: HCPCS | Performed by: PHYSICIAN ASSISTANT

## 2021-11-22 PROCEDURE — 73610 X-RAY EXAM OF ANKLE: CPT

## (undated) DEVICE — SUT ETHILON 4-0 PS-2 18 IN 1667H

## (undated) DEVICE — SUT ETHIBOND 3-0 RB-1 30 IN MX552

## (undated) DEVICE — IV CATH 18 G X 1.16 IN

## (undated) DEVICE — IMPLANTABLE DEVICE
Type: IMPLANTABLE DEVICE | Site: WRIST | Status: NON-FUNCTIONAL
Removed: 2019-07-05

## (undated) DEVICE — STERILE BETHLEHEM PLASTIC HAND: Brand: CARDINAL HEALTH

## (undated) DEVICE — SUT PROLENE 7-0 BV 175-6 24 IN 8735H

## (undated) DEVICE — BANDAGE, ESMARK LF STR 6"X9' (20/CS): Brand: CYPRESS

## (undated) DEVICE — SPONGE LAP 18 X 18 IN STRL RFD

## (undated) DEVICE — DRAPE C-ARM X-RAY

## (undated) DEVICE — IMPERVIOUS SURGICAL GOWN, LG: Brand: CONVERTORS

## (undated) DEVICE — 2.0MM QUICK RELEASE DRILL: Brand: ACUMED

## (undated) DEVICE — GLOVE INDICATOR UNDERGLOVE SZ 6.5 GREEN

## (undated) DEVICE — GLOVE PI ULTRA TOUCH SZ.7.0

## (undated) DEVICE — SPLINT 4 X 15 IN FAST SET PLASTER

## (undated) DEVICE — LIGHT GLOVE GREEN

## (undated) DEVICE — DRAPE STERI 1010 18IN X 12IN

## (undated) DEVICE — 2.8MM X 5" QUICK RELEASE DRILL: Brand: ACUMED

## (undated) DEVICE — SYRINGE 20ML LL

## (undated) DEVICE — 10FR FRAZIER SUCTION HANDLE: Brand: CARDINAL HEALTH

## (undated) DEVICE — MEDI-VAC YANK SUCT HNDL W/TPRD BULBOUS TIP: Brand: CARDINAL HEALTH

## (undated) DEVICE — GLOVE PI ULTRA TOUCH SZ.6.5

## (undated) DEVICE — SPONGE SCRUB 4 PCT CHLORHEXIDINE

## (undated) DEVICE — Device

## (undated) DEVICE — GLOVE INDICATOR PI UNDERGLOVE SZ 7 BLUE

## (undated) DEVICE — GLOVE SRG BIOGEL 8

## (undated) DEVICE — 2.3MM X 20MM LOCKING CORTICAL SCREW
Type: IMPLANTABLE DEVICE | Site: WRIST | Status: NON-FUNCTIONAL
Brand: ACUMED
Removed: 2019-07-05

## (undated) DEVICE — GLOVE INDICATOR PI UNDERGLOVE SZ 7.5 BLUE

## (undated) DEVICE — SLING MEDIUM ARM   84005

## (undated) DEVICE — INTENDED FOR TISSUE SEPARATION, AND OTHER PROCEDURES THAT REQUIRE A SHARP SURGICAL BLADE TO PUNCTURE OR CUT.: Brand: BARD-PARKER ® CARBON RIB-BACK BLADES

## (undated) DEVICE — TUBING SUCTION 5MM X 12 FT

## (undated) DEVICE — GLOVE SRG BIOGEL 6.5

## (undated) DEVICE — GLOVE INDICATOR PI UNDERGLOVE SZ 8 BLUE